# Patient Record
Sex: FEMALE | Race: WHITE | NOT HISPANIC OR LATINO | Employment: OTHER | URBAN - METROPOLITAN AREA
[De-identification: names, ages, dates, MRNs, and addresses within clinical notes are randomized per-mention and may not be internally consistent; named-entity substitution may affect disease eponyms.]

---

## 2017-01-05 ENCOUNTER — HISTORICAL (OUTPATIENT)
Dept: ADMINISTRATIVE | Facility: HOSPITAL | Age: 62
End: 2017-01-05

## 2017-01-12 LAB — CRC RECOMMENDATION EXT: NORMAL

## 2017-11-06 ENCOUNTER — HISTORICAL (OUTPATIENT)
Dept: ADMINISTRATIVE | Facility: HOSPITAL | Age: 62
End: 2017-11-06

## 2018-01-11 ENCOUNTER — HISTORICAL (OUTPATIENT)
Dept: RADIOLOGY | Facility: HOSPITAL | Age: 63
End: 2018-01-11

## 2018-01-11 LAB
DEPRECATED CALCIDIOL+CALCIFEROL SERPL-MC: 35.92 NG/ML (ref 30–80)
T3FREE SERPL-MCNC: 2.68 PG/ML (ref 2.18–3.98)
T4 FREE SERPL-MCNC: 1.01 NG/DL (ref 0.76–1.46)
TSH SERPL-ACNC: 1.12 MIU/ML (ref 0.36–3.74)

## 2018-02-19 ENCOUNTER — HISTORICAL (OUTPATIENT)
Dept: ANESTHESIOLOGY | Facility: HOSPITAL | Age: 63
End: 2018-02-19

## 2019-06-12 ENCOUNTER — HISTORICAL (OUTPATIENT)
Dept: ADMINISTRATIVE | Facility: HOSPITAL | Age: 64
End: 2019-06-12

## 2019-06-12 LAB
BUN SERPL-MCNC: 13 MG/DL (ref 7–18)
CALCIUM SERPL-MCNC: 8.8 MG/DL (ref 8.5–10.1)
CHLORIDE SERPL-SCNC: 107 MMOL/L (ref 98–107)
CHOLEST SERPL-MCNC: 192 MG/DL (ref 0–200)
CHOLEST/HDLC SERPL: 3.1 {RATIO} (ref 0–4)
CO2 SERPL-SCNC: 26 MMOL/L (ref 21–32)
CREAT SERPL-MCNC: 0.65 MG/DL (ref 0.55–1.02)
CREAT/UREA NIT SERPL: 20
DEPRECATED CALCIDIOL+CALCIFEROL SERPL-MC: 21.56 NG/ML (ref 30–80)
GLUCOSE SERPL-MCNC: 99 MG/DL (ref 74–106)
HDLC SERPL-MCNC: 62 MG/DL (ref 35–60)
LDLC SERPL CALC-MCNC: 100 MG/DL (ref 0–129)
POTASSIUM SERPL-SCNC: 4.6 MMOL/L (ref 3.5–5.1)
SODIUM SERPL-SCNC: 139 MMOL/L (ref 136–145)
TRIGL SERPL-MCNC: 148 MG/DL (ref 30–150)
TSH SERPL-ACNC: 1.73 MIU/L (ref 0.36–3.74)
VLDLC SERPL CALC-MCNC: 30 MG/DL

## 2019-10-18 ENCOUNTER — HISTORICAL (OUTPATIENT)
Dept: ADMINISTRATIVE | Facility: HOSPITAL | Age: 64
End: 2019-10-18

## 2019-10-18 LAB
EST. AVERAGE GLUCOSE BLD GHB EST-MCNC: 105 MG/DL
HBA1C MFR BLD: 5.3 % (ref 4.2–6.3)

## 2020-06-09 ENCOUNTER — HISTORICAL (OUTPATIENT)
Dept: ADMINISTRATIVE | Facility: HOSPITAL | Age: 65
End: 2020-06-09

## 2020-06-09 LAB
ABS NEUT (OLG): 3.06 X10(3)/MCL (ref 2.1–9.2)
ALBUMIN SERPL-MCNC: 3.7 GM/DL (ref 3.4–5)
ALBUMIN/GLOB SERPL: 1.4 RATIO (ref 1.1–2)
ALP SERPL-CCNC: 47 UNIT/L (ref 40–150)
ALT SERPL-CCNC: 15 UNIT/L (ref 0–55)
AST SERPL-CCNC: 13 UNIT/L (ref 5–34)
BASOPHILS # BLD AUTO: 0 X10(3)/MCL (ref 0–0.2)
BASOPHILS NFR BLD AUTO: 0 %
BILIRUB SERPL-MCNC: 0.4 MG/DL
BILIRUBIN DIRECT+TOT PNL SERPL-MCNC: 0.1 MG/DL (ref 0–0.5)
BILIRUBIN DIRECT+TOT PNL SERPL-MCNC: 0.3 MG/DL (ref 0–0.8)
BUN SERPL-MCNC: 16.9 MG/DL (ref 9.8–20.1)
CALCIUM SERPL-MCNC: 8.6 MG/DL (ref 8.4–10.2)
CHLORIDE SERPL-SCNC: 108 MMOL/L (ref 98–107)
CHOLEST SERPL-MCNC: 207 MG/DL
CHOLEST/HDLC SERPL: 4 {RATIO} (ref 0–5)
CO2 SERPL-SCNC: 24 MMOL/L (ref 23–31)
CREAT SERPL-MCNC: 0.66 MG/DL (ref 0.55–1.02)
DEPRECATED CALCIDIOL+CALCIFEROL SERPL-MC: 31.1 NG/ML (ref 6.6–49.9)
EOSINOPHIL # BLD AUTO: 0 X10(3)/MCL (ref 0–0.9)
EOSINOPHIL NFR BLD AUTO: 1 %
ERYTHROCYTE [DISTWIDTH] IN BLOOD BY AUTOMATED COUNT: 12.6 % (ref 11.5–17)
GLOBULIN SER-MCNC: 2.7 GM/DL (ref 2.4–3.5)
GLUCOSE SERPL-MCNC: 91 MG/DL (ref 82–115)
HCT VFR BLD AUTO: 41.8 % (ref 37–47)
HDLC SERPL-MCNC: 49 MG/DL (ref 35–60)
HGB BLD-MCNC: 13 GM/DL (ref 12–16)
LDLC SERPL CALC-MCNC: 114 MG/DL (ref 50–140)
LYMPHOCYTES # BLD AUTO: 1.9 X10(3)/MCL (ref 0.6–4.6)
LYMPHOCYTES NFR BLD AUTO: 35 %
MCH RBC QN AUTO: 30 PG (ref 27–31)
MCHC RBC AUTO-ENTMCNC: 31.1 GM/DL (ref 33–36)
MCV RBC AUTO: 96.3 FL (ref 80–94)
MONOCYTES # BLD AUTO: 0.4 X10(3)/MCL (ref 0.1–1.3)
MONOCYTES NFR BLD AUTO: 7 %
NEUTROPHILS # BLD AUTO: 3.06 X10(3)/MCL (ref 2.1–9.2)
NEUTROPHILS NFR BLD AUTO: 56 %
PLATELET # BLD AUTO: 211 X10(3)/MCL (ref 130–400)
PMV BLD AUTO: 9.9 FL (ref 9.4–12.4)
POTASSIUM SERPL-SCNC: 4.4 MMOL/L (ref 3.5–5.1)
PROT SERPL-MCNC: 6.4 GM/DL (ref 5.8–7.6)
RBC # BLD AUTO: 4.34 X10(6)/MCL (ref 4.2–5.4)
SODIUM SERPL-SCNC: 139 MMOL/L (ref 136–145)
TRIGL SERPL-MCNC: 221 MG/DL (ref 37–140)
TSH SERPL-ACNC: 1.45 UIU/ML (ref 0.35–4.94)
VLDLC SERPL CALC-MCNC: 44 MG/DL
WBC # SPEC AUTO: 5.5 X10(3)/MCL (ref 4.5–11.5)

## 2020-07-14 LAB — BMD RECOMMENDATION EXT: NORMAL

## 2020-12-30 ENCOUNTER — HISTORICAL (OUTPATIENT)
Dept: INFECTIOUS DISEASES | Facility: HOSPITAL | Age: 65
End: 2020-12-30

## 2021-01-22 ENCOUNTER — HISTORICAL (OUTPATIENT)
Dept: ADMINISTRATIVE | Facility: HOSPITAL | Age: 66
End: 2021-01-22

## 2021-06-10 ENCOUNTER — HISTORICAL (OUTPATIENT)
Dept: ADMINISTRATIVE | Facility: HOSPITAL | Age: 66
End: 2021-06-10

## 2021-06-10 LAB
ABS NEUT (OLG): 2.52 X10(3)/MCL (ref 2.1–9.2)
ALBUMIN SERPL-MCNC: 3.6 GM/DL (ref 3.4–4.8)
ALBUMIN/GLOB SERPL: 1.2 RATIO (ref 1.1–2)
ALP SERPL-CCNC: 49 UNIT/L (ref 40–150)
ALT SERPL-CCNC: 13 UNIT/L (ref 0–55)
AST SERPL-CCNC: 15 UNIT/L (ref 5–34)
BASOPHILS # BLD AUTO: 0 X10(3)/MCL (ref 0–0.2)
BASOPHILS NFR BLD AUTO: 1 %
BILIRUB SERPL-MCNC: 0.5 MG/DL
BILIRUBIN DIRECT+TOT PNL SERPL-MCNC: 0.2 MG/DL (ref 0–0.5)
BILIRUBIN DIRECT+TOT PNL SERPL-MCNC: 0.3 MG/DL (ref 0–0.8)
BUN SERPL-MCNC: 12.8 MG/DL (ref 9.8–20.1)
CALCIUM SERPL-MCNC: 9.1 MG/DL (ref 8.4–10.2)
CHLORIDE SERPL-SCNC: 106 MMOL/L (ref 98–107)
CO2 SERPL-SCNC: 23 MMOL/L (ref 23–31)
CREAT SERPL-MCNC: 0.65 MG/DL (ref 0.55–1.02)
DEPRECATED CALCIDIOL+CALCIFEROL SERPL-MC: 58.5 NG/ML (ref 30–80)
EOSINOPHIL # BLD AUTO: 0.1 X10(3)/MCL (ref 0–0.9)
EOSINOPHIL NFR BLD AUTO: 2 %
ERYTHROCYTE [DISTWIDTH] IN BLOOD BY AUTOMATED COUNT: 13.1 % (ref 11.5–17)
GLOBULIN SER-MCNC: 2.9 GM/DL (ref 2.4–3.5)
GLUCOSE SERPL-MCNC: 95 MG/DL (ref 82–115)
HCT VFR BLD AUTO: 38.9 % (ref 37–47)
HGB BLD-MCNC: 12.7 GM/DL (ref 12–16)
LYMPHOCYTES # BLD AUTO: 1.9 X10(3)/MCL (ref 0.6–4.6)
LYMPHOCYTES NFR BLD AUTO: 37 %
MCH RBC QN AUTO: 30 PG (ref 27–31)
MCHC RBC AUTO-ENTMCNC: 32.6 GM/DL (ref 33–36)
MCV RBC AUTO: 91.7 FL (ref 80–94)
MONOCYTES # BLD AUTO: 0.5 X10(3)/MCL (ref 0.1–1.3)
MONOCYTES NFR BLD AUTO: 10 %
NEUTROPHILS # BLD AUTO: 2.52 X10(3)/MCL (ref 2.1–9.2)
NEUTROPHILS NFR BLD AUTO: 50 %
PLATELET # BLD AUTO: 213 X10(3)/MCL (ref 130–400)
PMV BLD AUTO: 10.1 FL (ref 9.4–12.4)
POTASSIUM SERPL-SCNC: 4.7 MMOL/L (ref 3.5–5.1)
PROT SERPL-MCNC: 6.5 GM/DL (ref 5.8–7.6)
RBC # BLD AUTO: 4.24 X10(6)/MCL (ref 4.2–5.4)
SODIUM SERPL-SCNC: 142 MMOL/L (ref 136–145)
WBC # SPEC AUTO: 5 X10(3)/MCL (ref 4.5–11.5)

## 2021-07-15 LAB — BCS RECOMMENDATION EXT: NORMAL

## 2022-04-11 ENCOUNTER — HISTORICAL (OUTPATIENT)
Dept: ADMINISTRATIVE | Facility: HOSPITAL | Age: 67
End: 2022-04-11
Payer: MEDICARE

## 2022-04-28 VITALS
BODY MASS INDEX: 22.28 KG/M2 | SYSTOLIC BLOOD PRESSURE: 126 MMHG | WEIGHT: 147 LBS | DIASTOLIC BLOOD PRESSURE: 62 MMHG | HEIGHT: 68 IN | OXYGEN SATURATION: 99 %

## 2022-06-14 DIAGNOSIS — E55.9 VITAMIN D DEFICIENCY: Primary | ICD-10-CM

## 2022-06-14 DIAGNOSIS — Z13.220 SCREENING FOR HYPERLIPIDEMIA: ICD-10-CM

## 2022-06-14 DIAGNOSIS — E78.1 HYPERTRIGLYCERIDEMIA: ICD-10-CM

## 2022-06-14 DIAGNOSIS — Z72.0 TOBACCO USER: ICD-10-CM

## 2022-06-15 ENCOUNTER — LAB VISIT (OUTPATIENT)
Dept: LAB | Facility: HOSPITAL | Age: 67
End: 2022-06-15
Attending: INTERNAL MEDICINE
Payer: MEDICARE

## 2022-06-15 DIAGNOSIS — E78.1 HYPERTRIGLYCERIDEMIA: ICD-10-CM

## 2022-06-15 DIAGNOSIS — Z13.220 SCREENING FOR HYPERLIPIDEMIA: ICD-10-CM

## 2022-06-15 DIAGNOSIS — E55.9 VITAMIN D DEFICIENCY: ICD-10-CM

## 2022-06-15 DIAGNOSIS — Z72.0 TOBACCO USER: ICD-10-CM

## 2022-06-15 LAB
ALBUMIN SERPL-MCNC: 3.7 GM/DL (ref 3.4–4.8)
ALBUMIN/GLOB SERPL: 1.4 RATIO (ref 1.1–2)
ALP SERPL-CCNC: 60 UNIT/L (ref 40–150)
ALT SERPL-CCNC: 25 UNIT/L (ref 0–55)
AST SERPL-CCNC: 23 UNIT/L (ref 5–34)
BASOPHILS # BLD AUTO: 0.03 X10(3)/MCL (ref 0–0.2)
BASOPHILS NFR BLD AUTO: 0.6 %
BILIRUBIN DIRECT+TOT PNL SERPL-MCNC: 0.6 MG/DL
BUN SERPL-MCNC: 14.3 MG/DL (ref 9.8–20.1)
CALCIUM SERPL-MCNC: 8.8 MG/DL (ref 8.4–10.2)
CHLORIDE SERPL-SCNC: 109 MMOL/L (ref 98–107)
CHOLEST SERPL-MCNC: 197 MG/DL
CHOLEST/HDLC SERPL: 3 {RATIO} (ref 0–5)
CO2 SERPL-SCNC: 23 MMOL/L (ref 23–31)
CREAT SERPL-MCNC: 0.63 MG/DL (ref 0.55–1.02)
DEPRECATED CALCIDIOL+CALCIFEROL SERPL-MC: 57.1 NG/ML (ref 30–80)
EOSINOPHIL # BLD AUTO: 0.08 X10(3)/MCL (ref 0–0.9)
EOSINOPHIL NFR BLD AUTO: 1.6 %
ERYTHROCYTE [DISTWIDTH] IN BLOOD BY AUTOMATED COUNT: 12.8 % (ref 11.5–17)
GLOBULIN SER-MCNC: 2.7 GM/DL (ref 2.4–3.5)
GLUCOSE SERPL-MCNC: 100 MG/DL (ref 82–115)
HCT VFR BLD AUTO: 39.6 % (ref 37–47)
HDLC SERPL-MCNC: 57 MG/DL (ref 35–60)
HGB BLD-MCNC: 12.9 GM/DL (ref 12–16)
IMM GRANULOCYTES # BLD AUTO: 0.02 X10(3)/MCL (ref 0–0.02)
IMM GRANULOCYTES NFR BLD AUTO: 0.4 % (ref 0–0.43)
LDLC SERPL CALC-MCNC: 113 MG/DL (ref 50–140)
LYMPHOCYTES # BLD AUTO: 1.35 X10(3)/MCL (ref 0.6–4.6)
LYMPHOCYTES NFR BLD AUTO: 27.3 %
MCH RBC QN AUTO: 29.9 PG (ref 27–31)
MCHC RBC AUTO-ENTMCNC: 32.6 MG/DL (ref 33–36)
MCV RBC AUTO: 91.9 FL (ref 80–94)
MONOCYTES # BLD AUTO: 0.36 X10(3)/MCL (ref 0.1–1.3)
MONOCYTES NFR BLD AUTO: 7.3 %
NEUTROPHILS # BLD AUTO: 3.1 X10(3)/MCL (ref 2.1–9.2)
NEUTROPHILS NFR BLD AUTO: 62.8 %
NRBC BLD AUTO-RTO: 0 %
PLATELET # BLD AUTO: 220 X10(3)/MCL (ref 130–400)
PMV BLD AUTO: 9.9 FL (ref 9.4–12.4)
POTASSIUM SERPL-SCNC: 4.2 MMOL/L (ref 3.5–5.1)
PROT SERPL-MCNC: 6.4 GM/DL (ref 5.8–7.6)
RBC # BLD AUTO: 4.31 X10(6)/MCL (ref 4.2–5.4)
SODIUM SERPL-SCNC: 139 MMOL/L (ref 136–145)
TRIGL SERPL-MCNC: 133 MG/DL (ref 37–140)
VLDLC SERPL CALC-MCNC: 27 MG/DL
WBC # SPEC AUTO: 4.9 X10(3)/MCL (ref 4.5–11.5)

## 2022-06-15 PROCEDURE — 36415 COLL VENOUS BLD VENIPUNCTURE: CPT

## 2022-06-15 PROCEDURE — 80053 COMPREHEN METABOLIC PANEL: CPT

## 2022-06-15 PROCEDURE — 82306 VITAMIN D 25 HYDROXY: CPT

## 2022-06-15 PROCEDURE — 85025 COMPLETE CBC W/AUTO DIFF WBC: CPT

## 2022-06-15 PROCEDURE — 80061 LIPID PANEL: CPT

## 2022-06-21 ENCOUNTER — OFFICE VISIT (OUTPATIENT)
Dept: INTERNAL MEDICINE | Facility: CLINIC | Age: 67
End: 2022-06-21
Payer: MEDICARE

## 2022-06-21 ENCOUNTER — DOCUMENTATION ONLY (OUTPATIENT)
Dept: ADMINISTRATIVE | Facility: HOSPITAL | Age: 67
End: 2022-06-21
Payer: MEDICARE

## 2022-06-21 VITALS
SYSTOLIC BLOOD PRESSURE: 120 MMHG | OXYGEN SATURATION: 98 % | HEART RATE: 75 BPM | WEIGHT: 142 LBS | BODY MASS INDEX: 21.03 KG/M2 | HEIGHT: 69 IN | DIASTOLIC BLOOD PRESSURE: 72 MMHG | TEMPERATURE: 98 F

## 2022-06-21 DIAGNOSIS — E55.9 VITAMIN D DEFICIENCY: Primary | ICD-10-CM

## 2022-06-21 DIAGNOSIS — Z13.6 SCREENING FOR ISCHEMIC HEART DISEASE: ICD-10-CM

## 2022-06-21 DIAGNOSIS — Z82.49 FAMILY HISTORY OF EARLY CAD: ICD-10-CM

## 2022-06-21 DIAGNOSIS — Z00.00 ENCOUNTER FOR MEDICARE ANNUAL WELLNESS EXAM: ICD-10-CM

## 2022-06-21 PROCEDURE — 99387 PR PREVENTIVE VISIT,NEW,65 & OVER: ICD-10-PCS | Mod: GZ,,, | Performed by: INTERNAL MEDICINE

## 2022-06-21 PROCEDURE — 99387 INIT PM E/M NEW PAT 65+ YRS: CPT | Mod: GZ,,, | Performed by: INTERNAL MEDICINE

## 2022-06-21 RX ORDER — DIAZEPAM 5 MG/1
5 TABLET ORAL DAILY PRN
COMMUNITY
Start: 2021-06-17 | End: 2023-06-22 | Stop reason: SDUPTHER

## 2022-06-21 RX ORDER — DIPHENHYDRAMINE HCL 25 MG
25 CAPSULE ORAL NIGHTLY
COMMUNITY
End: 2022-11-08

## 2022-06-21 RX ORDER — ACETAMINOPHEN 500 MG
5000 TABLET ORAL DAILY
COMMUNITY

## 2022-06-21 NOTE — PROGRESS NOTES
Subjective:      Chief Complaint: Medicare AWV (Surgery clearance)      HPI: She is here for her medicare wellness.  No complaints.    Problem Noted   Encounter for Medicare Annual Wellness Exam 6/21/2022   Vitamin D Deficiency 6/21/2022   Family History of Early Cad 6/21/2022        The patient's Health Maintenance was reviewed and the following appears to be due:   Health Maintenance Due   Topic Date Due    Hepatitis C Screening  Never done    Colorectal Cancer Screening  Never done    COVID-19 Vaccine (3 - Booster for Moderna series) 10/03/2021    Mammogram  07/15/2022       Past Medical History:  History reviewed. No pertinent past medical history.  Past Surgical History:   Procedure Laterality Date    HYSTERECTOMY      LUMBAR DISC SURGERY      TONSILLECTOMY       Review of patient's allergies indicates:  No Known Allergies  Current Outpatient Medications on File Prior to Visit   Medication Sig Dispense Refill    cholecalciferol, vitamin D3, 125 mcg (5,000 unit) Tab Take 5,000 Units by mouth once daily.      COMPOUND HORMONE REPLACEMENT Take 1 capsule by mouth once daily. Biest/prog      diazePAM (VALIUM) 5 MG tablet Take 5 mg by mouth daily as needed for Pain.      diphenhydrAMINE (BENADRYL) 25 mg capsule Take 25 mg by mouth every evening.       No current facility-administered medications on file prior to visit.     Social History     Socioeconomic History    Marital status:    Tobacco Use    Smoking status: Light Tobacco Smoker     Types: Cigarettes    Smokeless tobacco: Never Used   Substance and Sexual Activity    Alcohol use: Yes     Comment: 4-5x/wk    Drug use: Never     Family History   Problem Relation Age of Onset    Coronary artery disease Mother     Coronary artery disease Father     Stroke Father     Coronary artery disease Sister 60    Coronary artery disease Brother 63    Breast cancer Maternal Grandmother        Review of Systems    Objective:   /72 (BP  "Location: Right arm, Patient Position: Sitting)   Pulse 75   Temp 98.4 °F (36.9 °C)   Ht 5' 9" (1.753 m)   Wt 64.4 kg (142 lb)   SpO2 98%   BMI 20.97 kg/m²     Physical Exam  Constitutional:       General: She is not in acute distress.     Appearance: Normal appearance.   HENT:      Head: Normocephalic and atraumatic.   Eyes:      General: No scleral icterus.     Conjunctiva/sclera: Conjunctivae normal.   Neck:      Vascular: No carotid bruit.   Cardiovascular:      Rate and Rhythm: Normal rate and regular rhythm.      Pulses: Normal pulses.      Heart sounds: Normal heart sounds. No murmur heard.    No friction rub. No gallop.   Pulmonary:      Effort: Pulmonary effort is normal.      Breath sounds: Normal breath sounds.   Abdominal:      General: Bowel sounds are normal.      Palpations: Abdomen is soft. There is no mass.      Tenderness: There is no abdominal tenderness. There is no guarding or rebound.   Musculoskeletal:         General: No deformity.      Cervical back: No rigidity or tenderness.      Right lower leg: No edema.      Left lower leg: No edema.   Lymphadenopathy:      Cervical: No cervical adenopathy.   Skin:     Coloration: Skin is not jaundiced or pale.      Findings: No erythema.   Neurological:      General: No focal deficit present.      Mental Status: She is alert and oriented to person, place, and time.      Gait: Gait normal.   Psychiatric:         Mood and Affect: Mood normal.         Behavior: Behavior normal.         Thought Content: Thought content normal.         Judgment: Judgment normal.       Assessment and Plan:     Encounter for Medicare annual wellness exam  Health Maintenance Due   Topic Date Due    Hepatitis C Screening  Never done    Colorectal Cancer Screening  Never done    COVID-19 Vaccine (3 - Booster for Moderna series) 10/03/2021    Mammogram  07/15/2022   She sees Dr. Snell who gives her the orders for her mmg.  A covid booster was recommended.  C-scope is " "up to date.    She already has an advanced directive.      Family history of early CAD  Will get EKG.  Also rec CT calcium score.    I have reviewed the nurse notes and flow sheets to reviewe the HRA, AD>, TUG and Whister test, Depression and fall screening and minim cog.    A personalized 5-10 yr written screening schedule and personal prevention plan has been developed using the uspstf age appropriate recommendations and this was provided to the patient at the end of today's visit.      Follow up in about 1 year (around 6/21/2023).       [unfilled]  Orders Placed This Encounter   Procedures    Lipid Panel     Standing Status:   Future     Standing Expiration Date:   8/20/2023    Comprehensive Metabolic Panel     Standing Status:   Future     Standing Expiration Date:   8/20/2023    CBC Auto Differential     Standing Status:   Future     Standing Expiration Date:   8/20/2023    Vitamin D     Standing Status:   Future     Standing Expiration Date:   8/20/2023    Hepatitis C Antibody     Standing Status:   Future     Standing Expiration Date:   8/20/2023     Order Specific Question:   Release to patient     Answer:   Immediate       TIME UP & GO (TUG)  Test begins with patient sitting back in standard arm chair.   When "Go" is said, the patient stands up and walks 10 feet at a normal pace before turning, walking back and sitting down.    Observe the patients postural stability, gait, stride length, and sway.  Check all that apply:  ? [] Slow tentative pace  ? [] Loss of balance  ? [] Short strides  ? [] Little or no arm swing  ? [] Steadying self on walls  ? [] Shuffling  ? [] En bloc turning  ? [] Not using assistive device properly    Time in seconds:  7 Seconds  (Older adults who takes = or > 12 seconds to complete TUG is at risk for falling.      WHISPER TEST  Test begins with patient standing arms length away (2 feet), facing away from examiner.  Patient covers the ear that is NOT being tested with one " finger over the tragus.  Whisper a number-letter-number combination.  If a patient gets 3 total letters and/or numbers correct after a second attempt, it is considered a pass.    Right Ear: passed    [] 8-M-3   [] K-5-R   [] 2-K-7   [] S-4-G  Left Ear: passed       [] 8-M-3   [] K-5-R   [] 2-K-7   [] S-4-G      VISION SCREENING  unable to measure      MINI-COGNITIVE  Three Word Registration   []Version 1 []Version 2 []Version 3 []Version 4 []Version 5 []Version 6   Banana Leader [facility] Captain Daughter   [employer] Kitchen [facility] [employer]   Chair Table Baby Finger Picture [place]ain     Word Recall 3 points  Clock Drawing 2      HOME SAFETY QUESTIONNAIRE  Are emergency numbers kept by the phone and regularly updated? Yes  Are all household members aware of the dangers of smoking, especially in bed? Yes  Are working smoke alarm(s) and fire extinguisher(s) available for use? Yes  Do all household members know how to use them? Yes  Are firearms stored unloaded and securely locked? Yes  Have throw rugs been removed or fastened down? Yes  Are non-slip mats in all bathtubs and showers?  Yes  Do all stairways have a railing or banister?  Yes  Are sidewalks and all outdoor steps clear of tools, toys and other articles?  Yes  Are doorways, halls, and stairs free of clutter?  Yes  Are all electrical cords in working order, easily seen, and not run under rug/carpets or wrapped around nails? Yes

## 2022-06-21 NOTE — ASSESSMENT & PLAN NOTE
Health Maintenance Due   Topic Date Due    Hepatitis C Screening  Never done    Colorectal Cancer Screening  Never done    COVID-19 Vaccine (3 - Booster for Moderna series) 10/03/2021    Mammogram  07/15/2022   She sees Dr. Snell who gives her the orders for her mmg.  A covid booster was recommended.  C-scope is up to date.

## 2022-06-21 NOTE — PATIENT INSTRUCTIONS
Jorge Siu,     If you are due for any health screening(s) below please notify me so we can arrange them to be ordered and scheduled to maintain your health. Most healthy patients complete it. Don't lose out on improving your health.     Health Maintenance   Topic Date Due    Hepatitis C Screening  Never done    Mammogram  07/15/2022    DEXA Scan  07/14/2023    Lipid Panel  06/15/2027    TETANUS VACCINE  06/07/2028            Although you are still overdue for this important screening, due to the COVID-19 pandemic, we recommend scheduling it in the near future.

## 2022-06-27 ENCOUNTER — DOCUMENTATION ONLY (OUTPATIENT)
Dept: INTERNAL MEDICINE | Facility: CLINIC | Age: 67
End: 2022-06-27
Payer: MEDICARE

## 2022-09-26 PROBLEM — Z00.00 ENCOUNTER FOR MEDICARE ANNUAL WELLNESS EXAM: Status: RESOLVED | Noted: 2022-06-21 | Resolved: 2022-09-26

## 2022-10-17 ENCOUNTER — DOCUMENTATION ONLY (OUTPATIENT)
Dept: ADMINISTRATIVE | Facility: HOSPITAL | Age: 67
End: 2022-10-17
Payer: MEDICARE

## 2022-11-08 ENCOUNTER — OFFICE VISIT (OUTPATIENT)
Dept: INTERNAL MEDICINE | Facility: CLINIC | Age: 67
End: 2022-11-08
Payer: MEDICARE

## 2022-11-08 VITALS
DIASTOLIC BLOOD PRESSURE: 80 MMHG | WEIGHT: 151.81 LBS | SYSTOLIC BLOOD PRESSURE: 128 MMHG | RESPIRATION RATE: 16 BRPM | HEIGHT: 69 IN | BODY MASS INDEX: 22.48 KG/M2 | HEART RATE: 79 BPM | OXYGEN SATURATION: 98 %

## 2022-11-08 DIAGNOSIS — Z23 NEED FOR INFLUENZA VACCINATION: Primary | ICD-10-CM

## 2022-11-08 DIAGNOSIS — R05.9 COUGH, UNSPECIFIED TYPE: ICD-10-CM

## 2022-11-08 DIAGNOSIS — G47.00 INSOMNIA, UNSPECIFIED TYPE: ICD-10-CM

## 2022-11-08 PROCEDURE — 96372 THER/PROPH/DIAG INJ SC/IM: CPT | Mod: ,,, | Performed by: NURSE PRACTITIONER

## 2022-11-08 PROCEDURE — 96372 PR INJECTION,THERAP/PROPH/DIAG2ST, IM OR SUBCUT: ICD-10-PCS | Mod: ,,, | Performed by: NURSE PRACTITIONER

## 2022-11-08 PROCEDURE — 99214 OFFICE O/P EST MOD 30 MIN: CPT | Mod: 25,,, | Performed by: NURSE PRACTITIONER

## 2022-11-08 PROCEDURE — G0008 ADMIN INFLUENZA VIRUS VAC: HCPCS | Mod: 59,,, | Performed by: NURSE PRACTITIONER

## 2022-11-08 PROCEDURE — 90694 FLU VACCINE - QUADRIVALENT - ADJUVANTED: ICD-10-PCS | Mod: ,,, | Performed by: NURSE PRACTITIONER

## 2022-11-08 PROCEDURE — 99214 PR OFFICE/OUTPT VISIT, EST, LEVL IV, 30-39 MIN: ICD-10-PCS | Mod: 25,,, | Performed by: NURSE PRACTITIONER

## 2022-11-08 PROCEDURE — G0008 FLU VACCINE - QUADRIVALENT - ADJUVANTED: ICD-10-PCS | Mod: 59,,, | Performed by: NURSE PRACTITIONER

## 2022-11-08 PROCEDURE — 90694 VACC AIIV4 NO PRSRV 0.5ML IM: CPT | Mod: ,,, | Performed by: NURSE PRACTITIONER

## 2022-11-08 RX ORDER — DEXAMETHASONE SODIUM PHOSPHATE 4 MG/ML
8 INJECTION, SOLUTION INTRA-ARTICULAR; INTRALESIONAL; INTRAMUSCULAR; INTRAVENOUS; SOFT TISSUE ONCE
Status: COMPLETED | OUTPATIENT
Start: 2022-11-08 | End: 2022-11-08

## 2022-11-08 RX ORDER — AZITHROMYCIN 250 MG/1
TABLET, FILM COATED ORAL
Qty: 6 TABLET | Refills: 0 | Status: SHIPPED | OUTPATIENT
Start: 2022-11-08 | End: 2022-11-13

## 2022-11-08 RX ORDER — TRAZODONE HYDROCHLORIDE 50 MG/1
25 TABLET ORAL NIGHTLY
Qty: 15 TABLET | Refills: 11 | Status: SHIPPED | OUTPATIENT
Start: 2022-11-08 | End: 2023-01-30

## 2022-11-08 RX ADMIN — DEXAMETHASONE SODIUM PHOSPHATE 8 MG: 4 INJECTION, SOLUTION INTRA-ARTICULAR; INTRALESIONAL; INTRAMUSCULAR; INTRAVENOUS; SOFT TISSUE at 11:11

## 2022-11-08 NOTE — PROGRESS NOTES
"Subjective:      Patient ID: Sherron Santos is a 67 y.o. female.    Chief Complaint: URI (Pt is here because she has had cold symptoms for 2 weeks, pt has post nasal drip, headache, off balance, dizzy, chest congestion, dry cough, no fever. Pt took covid test 2 fridays ago at home and it was pos and went to  and it was negatvie went end of october)      HPI: Patient here today for c/o "lingering cough" x 18 days. She reports + home COVID approx 2 weeks ago. C neg.  Denies fever, chills, or sweats. Cough is occ productive.  Slight PND.  No wheezing or SOB. No known sick exposure.    Vaccines: Flu    Review of patient's allergies indicates:  No Known Allergies    Review of Systems  Constitutional: No fever, No chills, No sweats, No fatigue  Ear/Nose/Mouth/Throat: nasal congestion, No vertigo. No sore throat. PND  Respiratory: No shortness of breath, cough with sputum production, No wheezing, No exertional dyspnea.   Cardiovascular: No chest pain, No palpitations  Neurologic: No altered mental status, No headaches.  Psychiatrics: No anxiety,No depression, No SI/HI. Insomnia    Objective:   /80 (BP Location: Left arm, Patient Position: Sitting, BP Method: Medium (Manual))   Pulse 79   Resp 16   Ht 5' 9" (1.753 m)   Wt 68.9 kg (151 lb 12.8 oz)   SpO2 98%   BMI 22.42 kg/m²     Physical Exam  Vitals and nursing note reviewed.   Constitutional:       Appearance: Normal appearance. She is normal weight.   HENT:      Head: Normocephalic and atraumatic.      Right Ear: Tympanic membrane, ear canal and external ear normal.      Left Ear: Tympanic membrane, ear canal and external ear normal.      Nose: Nose normal.      Mouth/Throat:      Mouth: Mucous membranes are moist.      Pharynx: Oropharynx is clear.   Cardiovascular:      Rate and Rhythm: Normal rate and regular rhythm.      Pulses: Normal pulses.      Heart sounds: Normal heart sounds.   Pulmonary:      Effort: Pulmonary effort is normal.      Breath " sounds: Normal breath sounds.   Musculoskeletal:         General: Normal range of motion.      Cervical back: Normal range of motion and neck supple.   Skin:     General: Skin is warm and dry.   Neurological:      General: No focal deficit present.      Mental Status: She is alert and oriented to person, place, and time. Mental status is at baseline.   Psychiatric:         Mood and Affect: Mood normal.         Thought Content: Thought content normal.         Judgment: Judgment normal.       Assessment/Plan:   1. Cough, unspecified type  Decadron IM today.  OTCs as rec.  For worsening/persistent s/s, proceed with [employer] [place] fluids.    - dexAMETHasone injection 8 mg  - azithromycin (Z-ISAC) 250 MG tablet; Take 2 tablets by mouth on day 1; Take 1 tablet by mouth on days 2-5  Dispense: 6 tablet; Refill: 0    2. Insomnia, unspecified type  Trial of trazodone 25-50mg at bed time.  Rec good sleep hygiene habits.    - traZODone (DESYREL) 50 MG tablet; Take 0.5 tablets (25 mg total) by mouth every evening.  Dispense: 15 tablet; Refill: 11    3. Need for influenza vaccination    - Influenza (FLUAD) - Quadrivalent (Adjuvanted) *Preferred* (65+) (PF)    Medication List with Changes/Refills   New Medications    AZITHROMYCIN (Z-ISAC) 250 MG TABLET    Take 2 tablets by mouth on day 1; Take 1 tablet by mouth on days 2-5    TRAZODONE (DESYREL) 50 MG TABLET    Take 0.5 tablets (25 mg total) by mouth every evening.   Current Medications    CHOLECALCIFEROL, VITAMIN D3, 125 MCG (5,000 UNIT) TAB    Take 5,000 Units by mouth once daily.    DIAZEPAM (VALIUM) 5 MG TABLET    Take 5 mg by mouth daily as needed for Pain.    UNABLE TO FIND    TAKE ONE CAPSULE BY MOUTH ONCE DAILY   Discontinued Medications    COMPOUND HORMONE REPLACEMENT    Take 1 capsule by mouth once daily. Biest/prog    DIPHENHYDRAMINE (BENADRYL) 25 MG CAPSULE    Take 25 mg by mouth every evening.        No follow-ups on file.

## 2023-01-30 ENCOUNTER — OFFICE VISIT (OUTPATIENT)
Dept: INTERNAL MEDICINE | Facility: CLINIC | Age: 68
End: 2023-01-30
Payer: MEDICARE

## 2023-01-30 VITALS
RESPIRATION RATE: 14 BRPM | OXYGEN SATURATION: 99 % | HEART RATE: 85 BPM | WEIGHT: 149 LBS | HEIGHT: 69 IN | SYSTOLIC BLOOD PRESSURE: 138 MMHG | BODY MASS INDEX: 22.07 KG/M2 | DIASTOLIC BLOOD PRESSURE: 80 MMHG

## 2023-01-30 DIAGNOSIS — G47.09 OTHER INSOMNIA: ICD-10-CM

## 2023-01-30 DIAGNOSIS — J01.90 ACUTE SINUSITIS, RECURRENCE NOT SPECIFIED, UNSPECIFIED LOCATION: Primary | ICD-10-CM

## 2023-01-30 DIAGNOSIS — G47.00 INSOMNIA, UNSPECIFIED TYPE: ICD-10-CM

## 2023-01-30 DIAGNOSIS — R05.1 ACUTE COUGH: ICD-10-CM

## 2023-01-30 PROCEDURE — 99214 OFFICE O/P EST MOD 30 MIN: CPT | Mod: ,,, | Performed by: NURSE PRACTITIONER

## 2023-01-30 PROCEDURE — 99214 PR OFFICE/OUTPT VISIT, EST, LEVL IV, 30-39 MIN: ICD-10-PCS | Mod: ,,, | Performed by: NURSE PRACTITIONER

## 2023-01-30 RX ORDER — PROMETHAZINE HYDROCHLORIDE AND DEXTROMETHORPHAN HYDROBROMIDE 6.25; 15 MG/5ML; MG/5ML
5 SYRUP ORAL EVERY 6 HOURS PRN
Qty: 120 ML | Refills: 0 | Status: SHIPPED | OUTPATIENT
Start: 2023-01-30 | End: 2023-02-09

## 2023-01-30 RX ORDER — TRAZODONE HYDROCHLORIDE 50 MG/1
50 TABLET ORAL NIGHTLY
Qty: 90 TABLET | Refills: 4 | Status: SHIPPED | OUTPATIENT
Start: 2023-01-30 | End: 2024-01-30

## 2023-01-30 RX ORDER — CEFDINIR 300 MG/1
300 CAPSULE ORAL 2 TIMES DAILY
Qty: 20 CAPSULE | Refills: 0 | Status: SHIPPED | OUTPATIENT
Start: 2023-01-30 | End: 2023-02-09

## 2023-01-30 NOTE — PROGRESS NOTES
"Subjective:      Patient ID: Sherron Santos is a 67 y.o. female.    Chief Complaint: Cough and Nasal Congestion (Pt has cough and nasal congestion and it is not color now but when it started several weeks ago it was green and it is lingering. [place] toler[place] but her  lit the fireplace and her nose started getting stuffy and a lot of post nasal drip, and runny nose)      HPI: Patient here today for c/o sinus issues x several weeks. Reports that s/s were stable; however, recurred over the weekend when  lit fireplace. States s/s PND and runny nose. She does utilize Flonase and Sudafed. Denies fever, chills, or sweats. Nasal d/c green/yellow presently. She admits to being in a crowd over the week, but had them prior to event. I did see me in Nov with RX of Z-pack and steroid IM with resolve of s/s at that time.    Review of patient's allergies indicates:  No Known Allergies    Review of Systems  Constitutional: No fever, No chills, No sweats, No fatigue  Ear/Nose/Mouth/Throat: nasal congestion, No vertigo.  Respiratory: No shortness of breath, No cough, No sputum production, No wheezing, No exertional dyspnea.   Cardiovascular: No chest pain, No palpitations  Gastrointestinal: No nausea, No vomiting    Objective:   /80 (BP Location: Left arm, Patient Position: Sitting, BP Method: Medium (Manual))   Pulse 85   Resp 14   Ht 5' 9" (1.753 m)   Wt 67.6 kg (149 lb)   SpO2 99%   BMI 22.00 kg/m²     Physical Exam  Vitals and nursing note reviewed.   Constitutional:       General: She is not in acute distress.     Appearance: Normal appearance. She is normal weight. She is not ill-appearing, toxic-appearing or diaphoretic.   HENT:      Head: Normocephalic and atraumatic.      Right Ear: Tympanic membrane, ear canal and external ear normal.      Left Ear: Tympanic membrane, ear canal and external ear normal.      Nose: Congestion and rhinorrhea present.    "   Mouth/Throat:      Mouth: Mucous membranes are moist.      Pharynx: Oropharynx is clear. No oropharyngeal exudate or posterior oropharyngeal erythema.   Cardiovascular:      Rate and Rhythm: Normal rate and regular rhythm.      Pulses: Normal pulses.      Heart sounds: Normal heart sounds. No murmur heard.  Pulmonary:      Effort: Pulmonary effort is normal. No respiratory distress.      Breath sounds: Normal breath sounds. No wheezing or rhonchi.   Musculoskeletal:         General: Normal range of motion.      Cervical back: Normal range of motion and neck supple. No rigidity or tenderness.   Lymphadenopathy:      Cervical: No cervical adenopathy.   Skin:     General: Skin is warm and dry.   Neurological:      General: No focal deficit present.      Mental Status: She is alert and oriented to person, place, and time. Mental status is at baseline.      Motor: No weakness.   Psychiatric:         Mood and Affect: Mood normal.         Thought Content: Thought content normal.         Judgment: Judgment normal.       Assessment/Plan:   1. Acute sinusitis, recurrence not specified, unspecified location  Tx with cefdinir 1 tab twice daily x 10 days  OTCs as needed/directed-written guidance provided    - cefdinir (OMNICEF) 300 MG capsule; Take 1 capsule (300 mg total) by mouth 2 (two) times daily. for 10 days  Dispense: 20 capsule; Refill: 0    2. Other insomnia  Doing well on current Rx'd dose of trazodone at 50mg daily  Med refilled    3. Insomnia, unspecified type  See above.  - traZODone (DESYREL) 50 MG tablet; Take 1 tablet (50 mg total) by mouth every evening.  Dispense: 90 tablet; Refill: 4    4. Acute cough  OTCs as needed/directed-written guidance provided  Promethazine DM to be taken at bedtime as needed for cough.    - promethazine-dextromethorphan (PROMETHAZINE-DM) 6.25-15 mg/5 mL Syrp; Take 5 mLs by mouth every 6 (six) hours as needed (cough).  Dispense: 120 mL; Refill: 0      Medication List with  Changes/Refills   New Medications    CEFDINIR (OMNICEF) 300 MG CAPSULE    Take 1 capsule (300 mg total) by mouth 2 (two) times daily. for 10 days    PROMETHAZINE-DEXTROMETHORPHAN (PROMETHAZINE-DM) 6.25-15 MG/5 ML SYRP    Take 5 mLs by mouth every 6 (six) hours as needed (cough).   Current Medications    CHOLECALCIFEROL, VITAMIN D3, 125 MCG (5,000 UNIT) TAB    Take 5,000 Units by mouth once daily.    COMPOUND HORMONE REPLACEMENT    TAKE ONE CAPSULE BY MOUTH ONCE DAILY    DIAZEPAM (VALIUM) 5 MG TABLET    Take 5 mg by mouth daily as needed for Pain.    UNABLE TO FIND    TAKE ONE CAPSULE BY MOUTH ONCE DAILY   Changed and/or Refilled Medications    Modified Medication Previous Medication    TRAZODONE (DESYREL) 50 MG TABLET traZODone (DESYREL) 50 MG tablet       Take 1 tablet (50 mg total) by mouth every evening.    Take 0.5 tablets (25 mg total) by mouth every evening.        No follow-ups on file.

## 2023-06-15 ENCOUNTER — TELEPHONE (OUTPATIENT)
Dept: INTERNAL MEDICINE | Facility: CLINIC | Age: 68
End: 2023-06-15
Payer: MEDICARE

## 2023-06-15 ENCOUNTER — LAB VISIT (OUTPATIENT)
Dept: LAB | Facility: HOSPITAL | Age: 68
End: 2023-06-15
Attending: INTERNAL MEDICINE
Payer: MEDICARE

## 2023-06-15 DIAGNOSIS — Z00.00 ENCOUNTER FOR MEDICARE ANNUAL WELLNESS EXAM: ICD-10-CM

## 2023-06-15 DIAGNOSIS — E55.9 VITAMIN D DEFICIENCY: ICD-10-CM

## 2023-06-15 DIAGNOSIS — Z82.49 FAMILY HISTORY OF EARLY CAD: ICD-10-CM

## 2023-06-15 DIAGNOSIS — Z13.6 SCREENING FOR ISCHEMIC HEART DISEASE: ICD-10-CM

## 2023-06-15 LAB
ALBUMIN SERPL-MCNC: 3.7 G/DL (ref 3.4–4.8)
ALBUMIN/GLOB SERPL: 1.5 RATIO (ref 1.1–2)
ALP SERPL-CCNC: 50 UNIT/L (ref 40–150)
ALT SERPL-CCNC: 18 UNIT/L (ref 0–55)
AST SERPL-CCNC: 16 UNIT/L (ref 5–34)
BASOPHILS # BLD AUTO: 0.04 X10(3)/MCL
BASOPHILS NFR BLD AUTO: 0.7 %
BILIRUBIN DIRECT+TOT PNL SERPL-MCNC: 0.4 MG/DL
BUN SERPL-MCNC: 13.7 MG/DL (ref 9.8–20.1)
CALCIUM SERPL-MCNC: 8.6 MG/DL (ref 8.4–10.2)
CHLORIDE SERPL-SCNC: 109 MMOL/L (ref 98–107)
CHOLEST SERPL-MCNC: 212 MG/DL
CHOLEST/HDLC SERPL: 4 {RATIO} (ref 0–5)
CO2 SERPL-SCNC: 26 MMOL/L (ref 23–31)
CREAT SERPL-MCNC: 0.74 MG/DL (ref 0.55–1.02)
DEPRECATED CALCIDIOL+CALCIFEROL SERPL-MC: 64.5 NG/ML (ref 30–80)
EOSINOPHIL # BLD AUTO: 0.13 X10(3)/MCL (ref 0–0.9)
EOSINOPHIL NFR BLD AUTO: 2.3 %
ERYTHROCYTE [DISTWIDTH] IN BLOOD BY AUTOMATED COUNT: 12.6 % (ref 11.5–17)
GFR SERPLBLD CREATININE-BSD FMLA CKD-EPI: >60 MLS/MIN/1.73/M2
GLOBULIN SER-MCNC: 2.5 GM/DL (ref 2.4–3.5)
GLUCOSE SERPL-MCNC: 91 MG/DL (ref 82–115)
HCT VFR BLD AUTO: 41.4 % (ref 37–47)
HCV AB SERPL QL IA: NONREACTIVE
HDLC SERPL-MCNC: 53 MG/DL (ref 35–60)
HGB BLD-MCNC: 13.2 G/DL (ref 12–16)
IMM GRANULOCYTES # BLD AUTO: 0.01 X10(3)/MCL (ref 0–0.04)
IMM GRANULOCYTES NFR BLD AUTO: 0.2 %
LDLC SERPL CALC-MCNC: 122 MG/DL (ref 50–140)
LYMPHOCYTES # BLD AUTO: 1.98 X10(3)/MCL (ref 0.6–4.6)
LYMPHOCYTES NFR BLD AUTO: 34.7 %
MCH RBC QN AUTO: 30 PG (ref 27–31)
MCHC RBC AUTO-ENTMCNC: 31.9 G/DL (ref 33–36)
MCV RBC AUTO: 94.1 FL (ref 80–94)
MONOCYTES # BLD AUTO: 0.48 X10(3)/MCL (ref 0.1–1.3)
MONOCYTES NFR BLD AUTO: 8.4 %
NEUTROPHILS # BLD AUTO: 3.07 X10(3)/MCL (ref 2.1–9.2)
NEUTROPHILS NFR BLD AUTO: 53.7 %
NRBC BLD AUTO-RTO: 0 %
PLATELET # BLD AUTO: 220 X10(3)/MCL (ref 130–400)
PMV BLD AUTO: 9.8 FL (ref 7.4–10.4)
POTASSIUM SERPL-SCNC: 4.8 MMOL/L (ref 3.5–5.1)
PROT SERPL-MCNC: 6.2 GM/DL (ref 5.8–7.6)
RBC # BLD AUTO: 4.4 X10(6)/MCL (ref 4.2–5.4)
SODIUM SERPL-SCNC: 141 MMOL/L (ref 136–145)
TRIGL SERPL-MCNC: 187 MG/DL (ref 37–140)
VLDLC SERPL CALC-MCNC: 37 MG/DL
WBC # SPEC AUTO: 5.71 X10(3)/MCL (ref 4.5–11.5)

## 2023-06-15 PROCEDURE — 36415 COLL VENOUS BLD VENIPUNCTURE: CPT

## 2023-06-15 PROCEDURE — 80061 LIPID PANEL: CPT

## 2023-06-15 PROCEDURE — 80053 COMPREHEN METABOLIC PANEL: CPT

## 2023-06-15 PROCEDURE — 86803 HEPATITIS C AB TEST: CPT

## 2023-06-15 PROCEDURE — 85025 COMPLETE CBC W/AUTO DIFF WBC: CPT

## 2023-06-15 PROCEDURE — 82306 VITAMIN D 25 HYDROXY: CPT

## 2023-06-15 NOTE — TELEPHONE ENCOUNTER
----- Message from Yareli Nelson LPN sent at 6/15/2023  7:48 AM CDT -----  Regarding: ANDREIA Montanez 6/22/23 @0929  Are there any outstanding tasks in the chart? Pt will need fasting labs    Is there any documentation of tasks? no    Has patient seen another physician, been to the ER, C, or admitted to hospital since last visit?    Has the patient done blood work or imaging since last visit?

## 2023-06-22 ENCOUNTER — OFFICE VISIT (OUTPATIENT)
Dept: INTERNAL MEDICINE | Facility: CLINIC | Age: 68
End: 2023-06-22
Payer: MEDICARE

## 2023-06-22 VITALS
OXYGEN SATURATION: 99 % | RESPIRATION RATE: 18 BRPM | HEIGHT: 69 IN | SYSTOLIC BLOOD PRESSURE: 114 MMHG | WEIGHT: 151 LBS | BODY MASS INDEX: 22.36 KG/M2 | HEART RATE: 71 BPM | DIASTOLIC BLOOD PRESSURE: 68 MMHG

## 2023-06-22 DIAGNOSIS — Z00.00 ENCOUNTER FOR MEDICARE ANNUAL WELLNESS EXAM: ICD-10-CM

## 2023-06-22 DIAGNOSIS — E55.9 VITAMIN D DEFICIENCY: Primary | ICD-10-CM

## 2023-06-22 DIAGNOSIS — N95.1 INSOMNIA ASSOCIATED WITH MENOPAUSE: ICD-10-CM

## 2023-06-22 DIAGNOSIS — E78.5 HYPERLIPIDEMIA, UNSPECIFIED HYPERLIPIDEMIA TYPE: ICD-10-CM

## 2023-06-22 PROCEDURE — G0439 PR MEDICARE ANNUAL WELLNESS SUBSEQUENT VISIT: ICD-10-PCS | Mod: ,,, | Performed by: INTERNAL MEDICINE

## 2023-06-22 PROCEDURE — G0439 PPPS, SUBSEQ VISIT: HCPCS | Mod: ,,, | Performed by: INTERNAL MEDICINE

## 2023-06-22 RX ORDER — DIAZEPAM 5 MG/1
5 TABLET ORAL DAILY PRN
Qty: 30 TABLET | Refills: 0 | Status: SHIPPED | OUTPATIENT
Start: 2023-06-22

## 2023-06-22 RX ORDER — LORATADINE 10 MG/1
10 TABLET ORAL DAILY
COMMUNITY

## 2023-06-22 NOTE — PROGRESS NOTES
Subjective:        Patient Care Team:  Angelica Montanez MD as PCP - General (Internal Medicine)     Chief Complaint: Medicare AWV (Discuss labs/)      HPI:  She is here for her medicare wellness.  She hasn't been sleeping well.  She doesn't nap during the day.  She isn't exercising enough.    Problem Noted   Encounter for Medicare Annual Wellness Exam 6/21/2022   Insomnia Associated With Menopause 6/22/2023   Vitamin D Deficiency 6/21/2022   Family History of Early Cad 6/21/2022        The patient's Health Maintenance was reviewed and the following appears to be due:   Health Maintenance Due   Topic Date Due    COVID-19 Vaccine (3 - Moderna series) 06/28/2021    DEXA Scan  07/14/2022    Mammogram  08/02/2023       Past Medical History:  Past Medical History:   Diagnosis Date    Personal history of colonic polyps      Past Surgical History:   Procedure Laterality Date    CATARACT EXTRACTION Bilateral 07/2022    COLONOSCOPY W/ POLYPECTOMY  01/12/2017    HYSTERECTOMY      LUMBAR DISC SURGERY      TONSILLECTOMY       Review of patient's allergies indicates:  No Known Allergies  Current Outpatient Medications on File Prior to Visit   Medication Sig Dispense Refill    cholecalciferol, vitamin D3, 125 mcg (5,000 unit) Tab Take 5,000 Units by mouth once daily.      COMPOUND HORMONE REPLACEMENT TAKE ONE CAPSULE BY MOUTH ONCE DAILY      loratadine (CLARITIN) 10 mg tablet Take 10 mg by mouth once daily.      traZODone (DESYREL) 50 MG tablet Take 1 tablet (50 mg total) by mouth every evening. 90 tablet 4    UNABLE TO FIND Take 2 tablets by mouth once daily. Hair Hero vitamin      [DISCONTINUED] diazePAM (VALIUM) 5 MG tablet Take 5 mg by mouth daily as needed for Pain.       No current facility-administered medications on file prior to visit.     Social History     Socioeconomic History    Marital status:    Tobacco Use    Smoking status: Former     Types: Cigarettes    Smokeless tobacco: Never   Substance and Sexual  Activity    Alcohol use: Yes     Comment: 4-5x/wk    Drug use: Never    Sexual activity: Yes     Family History   Problem Relation Age of Onset    Coronary artery disease Mother     Coronary artery disease Father     Stroke Father     Coronary artery disease Sister 60    Coronary artery disease Brother 63    Breast cancer Maternal Grandmother        Review of Systems    Patient Reported Health Risk Assessment  What [age]?: 65-69  Are you male or female?: Female  During the past four weeks, how much have you been bothered by emotional problems such as feeling anxious, depressed, irritable, sad, or downhearted and blue?: Not at all  During the past five weeks, has your physical and/or emotional health limited your social activities with family, friends, neighbors, or groups?: Not at all  During the past four weeks, how much bodily pain have you generally had?: No pain  During the past four weeks, was someone available to help if you needed and wanted help?: Yes, as much as I wanted  During the past four weeks, what was the hardest physical activity you could do for at least two minutes?: Moderate  Can you get to places out of walking distance without help?  (For example, can you travel alone on buses or taxis, or drive your own car?): Yes  Can you go shopping for groceries or clothes without someone's help?: Yes  Can you prepare your own meals?: Yes  Can you do your own housework without help?: Yes  Because of any health problems, do you need the help of another person with your personal care needs such as eating, bathing, dressing, or getting around the house?: No  Can you handle your own money without help?: Yes  During the past four weeks, how would you rate your health in general?: Excellent  How have things been going for you during the past four weeks?: Very well  Are you having difficulties driving your car?: No  Do you always fasten your seat belt when you are in a car?: Yes, usually  How often in the  "past four weeks have you been bothered by falling or dizzy when standing up?: Never  How often in the past four weeks have you been bothered by sexual problems?: Never  How often in the past four weeks have you been bothered by trouble eating well?: Never  How often in the past four weeks have you been bothered by teeth or denture problems?: Never  How often in the past four weeks have you been bothered with problems using the telephone?: Never  How often in the past four weeks have you been bothered by tiredness or fatigue?: Never  Have you fallen two or more times in the past year?: No  Are you afraid of falling?: No  Are you a smoker?: No  During the past four weeks, how many drinks of wine, beer, or other alcoholic beverages did you have?: 2-5 drinks per weeks  Do you exercise for about 20 minutes three or more days a week?: Yes, some of the time  Have you been given any information to help you with hazards in your house that might hurt you?: Yes  Have you been given any information to help you with keeping track of your medications?: Yes  How often do you have trouble taking medicines the way you've been told to take them?: I always take them as prescribed  How confident are you that you can control and manage most of your health problems?: Very confident  What is your race? (Check all that apply.):     Opioid Screening: Patient medication list reviewed, patient is not taking prescription opioids. Patient is not using additional opioids than prescribed. Patient is at low risk of substance abuse based on this opioid use history.     Objective:   /68 (BP Location: Right arm, Patient Position: Sitting, BP Method: Small (Manual))   Pulse 71   Resp 18   Ht 5' 9.02" (1.753 m)   Wt 68.5 kg (151 lb)   SpO2 99%   BMI 22.29 kg/m²     Physical Exam  Constitutional:       General: She is not in acute distress.     Appearance: Normal appearance.   HENT:      Head: Normocephalic and atraumatic.   Eyes: "      General: No scleral icterus.     Conjunctiva/sclera: Conjunctivae normal.   Neck:      Vascular: No carotid bruit.   Cardiovascular:      Rate and Rhythm: Normal rate and regular rhythm.      Pulses: Normal pulses.      Heart sounds: Normal heart sounds. No murmur heard.    No friction rub. No gallop.   Pulmonary:      Effort: Pulmonary effort is normal.      Breath sounds: Normal breath sounds.   Abdominal:      General: Bowel sounds are normal.      Palpations: Abdomen is soft. There is no mass.      Tenderness: There is no abdominal tenderness. There is no guarding or rebound.   Musculoskeletal:         General: No deformity.      Cervical back: No rigidity or tenderness.      Right lower leg: No edema.      Left lower leg: No edema.   Lymphadenopathy:      Cervical: No cervical adenopathy.   Skin:     Coloration: Skin is not jaundiced or pale.      Findings: No erythema.   Neurological:      General: No focal deficit present.      Mental Status: She is alert and oriented to person, place, and time.      Gait: Gait normal.   Psychiatric:         Mood and Affect: Mood normal.         Behavior: Behavior normal.         Thought Content: Thought content normal.         Judgment: Judgment normal.         No flowsheet data found.  Fall Risk Assessment - Outpatient 6/22/2023 1/30/2023 11/8/2022 6/21/2022   Mobility Status Ambulatory Ambulatory Ambulatory Ambulatory   Number of falls 0 0 0 0   Identified as fall risk 0 0 0 0           Depression Screening  Over the past two weeks, has the patient felt down, depressed, or hopeless?: No  Over the past two weeks, has the patient felt little interest or pleasure in doing things?: No  Functional Ability/Safety Screening  Was the patient's timed Up & Go test unsteady or longer than 30 seconds?: No  Does the patient need help with phone, transportation, shopping, preparing meals, housework, laundry, meds, or managing money?: No  Does the patient's home have rugs in the  hallway, lack grab bars in the bathroom, lack handrails on the stairs or have poor lighting?: No  Have you noticed any hearing difficulties?: No  Cognitive Function (Assessed through direct observation with due consideration of information obtained by way of patient reports and/or concerns raised by family, friends, caretakers, or others)    Does the patient repeat questions/statements in the same day?: No  Does the patient have trouble remembering the date, year, and time?: No  Does the patient have difficulty managing finances?: No  Does the patient have a decreased sense of direction?: No    Assessment and Plan:     Encounter for Medicare annual wellness exam  Health Maintenance Due   Topic Date Due    COVID-19 Vaccine (3 - Moderna series) 06/28/2021    DEXA Scan  07/14/2022    Mammogram  08/02/2023     The 10-year ASCVD risk score (Samantha MATOS, et al., 2019) is: 6.4%    Values used to calculate the score:      Age: 67 years      Sex: Female      Is Non- : No      Diabetic: No      Tobacco smoker: No      Systolic Blood Pressure: 114 mmHg      Is BP treated: No      HDL Cholesterol: 53 mg/dL      Total Cholesterol: 212 mg/dL    Recent labs reviewed and discussed.  Her last bmd in 2020 was normal.  So I don't think she needs to repeat it until 2025.  Dr. Snell give her the orders for her mmg.    Vitamin D deficiency  At goal, cont current supplement.    Insomnia associated with menopause  OK to double the trazodone prn.   Follow up in about 1 year (around 6/22/2024).    Medications Ordered This Encounter   Medications    diazePAM (VALIUM) 5 MG tablet     Sig: Take 1 tablet (5 mg total) by mouth daily as needed for Anxiety.     Dispense:  30 tablet     Refill:  0     [unfilled]  No orders of the defined types were placed in this encounter.        Medicare Annual Wellness and Personalized Prevention Plan:   Fall Risk + Home Safety + Hearing Impairment + Depression Screen + Cognitive  Impairment Screen + Health Risk Assessment all reviewed    Advance Care Planning   I attest to discussing Advance Care Planning with patient and/or family member.  Education was provided including the importance of the Health Care Power of , Advance Directives, and/or [place] documentation.  The patient expressed understanding to the importance of this information and discussion.       Follow up in about 1 year (around 6/22/2024). In addition to their scheduled follow up, the patient has also been instructed to follow up on as needed basis.

## 2023-06-22 NOTE — ASSESSMENT & PLAN NOTE
Health Maintenance Due   Topic Date Due    COVID-19 Vaccine (3 - Moderna series) 06/28/2021    DEXA Scan  07/14/2022    Mammogram  08/02/2023     The 10-year ASCVD risk score (Samantha MATOS, et al., 2019) is: 6.4%    Values used to calculate the score:      Age: 67 years      Sex: Female      Is Non- : No      Diabetic: No      Tobacco smoker: No      Systolic Blood Pressure: 114 mmHg      Is BP treated: No      HDL Cholesterol: 53 mg/dL      Total Cholesterol: 212 mg/dL    Recent labs reviewed and discussed.  Her last bmd in 2020 was normal.  So I don't think she needs to repeat it until 2025.  Dr. Snell give her the orders for her mmg.

## 2023-08-15 ENCOUNTER — OFFICE VISIT (OUTPATIENT)
Dept: URGENT CARE | Facility: CLINIC | Age: 68
End: 2023-08-15
Payer: MEDICARE

## 2023-08-15 VITALS
OXYGEN SATURATION: 98 % | RESPIRATION RATE: 19 BRPM | BODY MASS INDEX: 22.36 KG/M2 | WEIGHT: 151 LBS | SYSTOLIC BLOOD PRESSURE: 145 MMHG | HEIGHT: 69 IN | HEART RATE: 72 BPM | DIASTOLIC BLOOD PRESSURE: 85 MMHG | TEMPERATURE: 99 F

## 2023-08-15 DIAGNOSIS — R21 RASH AND NONSPECIFIC SKIN ERUPTION: Primary | ICD-10-CM

## 2023-08-15 PROCEDURE — 96372 PR INJECTION,THERAP/PROPH/DIAG2ST, IM OR SUBCUT: ICD-10-PCS | Mod: ,,, | Performed by: FAMILY MEDICINE

## 2023-08-15 PROCEDURE — 96372 THER/PROPH/DIAG INJ SC/IM: CPT | Mod: ,,, | Performed by: FAMILY MEDICINE

## 2023-08-15 PROCEDURE — 99213 PR OFFICE/OUTPT VISIT, EST, LEVL III, 20-29 MIN: ICD-10-PCS | Mod: 25,,, | Performed by: FAMILY MEDICINE

## 2023-08-15 PROCEDURE — 99213 OFFICE O/P EST LOW 20 MIN: CPT | Mod: 25,,, | Performed by: FAMILY MEDICINE

## 2023-08-15 RX ORDER — PREDNISONE 10 MG/1
30 TABLET ORAL DAILY
Qty: 15 TABLET | Refills: 0 | Status: SHIPPED | OUTPATIENT
Start: 2023-08-15 | End: 2023-08-20

## 2023-08-15 NOTE — PROGRESS NOTES
"Subjective:      Patient ID: Sherron Santos is a 67 y.o. female.    Vitals:  height is 5' 9" (1.753 m) and weight is 68.5 kg (151 lb). Her temperature is 98.5 °F (36.9 °C). Her blood pressure is 145/85 (abnormal) and her pulse is 72. Her respiration is 19 and oxygen saturation is 98%.     Chief Complaint: Rash (Pt thinks they gotten a rash on hands, arms, chest,and face from shrimp started this morning. No OTC meds taken.)    59-year-old female presents to clinic complaining of a red rash on the face and arms that began today.  Patient states she was out in the yd earlier today, was in her pulled doing water aerobics, and then was D frosting some shrimp, patient is concerned it is related to the shrimp because the rash began soon after she handled the shrimp.  Patient states she eats shrimp often and has never had a reaction to it.  Denies any shortness a breath wheezing lip swelling or tongue swelling.  Patient denies any recent travel.  Denies any changes in foods or medications.  Denies any new pets in the house.  Denies any pain related to the rash.  States the rash initially felt warm and prickly but that has resolved.  Denies any itching.  Patient did not eat the shrimp today she only Defrosted them         Constitution: Negative.   HENT: Negative.     Cardiovascular: Negative.    Eyes: Negative.    Respiratory: Negative.     Gastrointestinal: Negative.    Genitourinary: Negative.    Musculoskeletal: Negative.    Skin:  Positive for rash. Negative for erythema.   Allergic/Immunologic: Negative.    Neurological: Negative.    Hematologic/Lymphatic: Negative.       Objective:     Physical Exam   Constitutional: She is oriented to person, place, and time.  Non-toxic appearance. She does not appear ill. No distress.   HENT:   Mouth/Throat: No posterior oropharyngeal erythema (no lip or tongue swelling). Oropharynx is clear.   Eyes: Conjunctivae are normal.   Pulmonary/Chest: Effort normal and breath sounds normal. " "No respiratory distress. She has no wheezes. She has no rhonchi. She has no rales.   Abdominal: Normal appearance.   Neurological: She is alert and oriented to person, place, and time.   Skin: Skin is not diaphoretic and rash (erythemic macular rash of the bilateral upper extremities and face). No erythema   Psychiatric: Her behavior is normal. Mood, judgment and thought content normal.   Nursing note and vitals reviewed.         Previous History      Review of patient's allergies indicates:  No Known Allergies    Past Medical History:   Diagnosis Date    Anxiety     Insomnia     Personal history of colonic polyps      Current Outpatient Medications   Medication Instructions    cholecalciferol (vitamin D3) 5,000 Units, Oral, Daily    COMPOUND HORMONE REPLACEMENT TAKE ONE CAPSULE BY MOUTH ONCE DAILY    diazePAM (VALIUM) 5 mg, Oral, Daily PRN    loratadine (CLARITIN) 10 mg, Oral, Daily    predniSONE (DELTASONE) 30 mg, Oral, Daily    traZODone (DESYREL) 50 mg, Oral, Nightly    UNABLE TO FIND 2 tablets, Oral, Daily, Hair Hero vitamin     Past Surgical History:   Procedure Laterality Date    CATARACT EXTRACTION Bilateral 07/2022    COLONOSCOPY W/ POLYPECTOMY  01/12/2017    HYSTERECTOMY      LUMBAR DISC SURGERY      TONSILLECTOMY       Family History   Problem Relation Age of Onset    Coronary artery disease Mother     Coronary artery disease Father     Stroke Father     Coronary artery disease Sister 60    Coronary artery disease Brother 63    Breast cancer Maternal Grandmother        Social History     Tobacco Use    Smoking status: Former     Current packs/day: 0.00     Types: Cigarettes     Passive exposure: Never    Smokeless tobacco: Never   Substance Use Topics    Alcohol use: Yes     Comment: 4-5x/wk    Drug use: Never        Physical Exam      Vital Signs Reviewed   BP (!) 145/85   Pulse 72   Temp 98.5 °F (36.9 °C)   Resp 19   Ht 5' 9" (1.753 m)   Wt 68.5 kg (151 lb)   SpO2 98%   BMI 22.30 kg/m²        " Procedures    Procedures     Labs     Results for orders placed or performed in visit on 06/15/23   Lipid Panel   Result Value Ref Range    Cholesterol Total 212 (H) <=200 mg/dL    HDL Cholesterol 53 35 - 60 mg/dL    Triglyceride 187 (H) 37 - 140 mg/dL    Cholesterol/HDL Ratio 4 0 - 5    Very Low Density Lipoprotein 37     LDL Cholesterol 122.00 50.00 - 140.00 mg/dL   Comprehensive Metabolic Panel   Result Value Ref Range    Sodium Level 141 136 - 145 mmol/L    Potassium Level 4.8 3.5 - 5.1 mmol/L    Chloride 109 (H) 98 - 107 mmol/L    Carbon Dioxide 26 23 - 31 mmol/L    Glucose Level 91 82 - 115 mg/dL    Blood Urea Nitrogen 13.7 9.8 - 20.1 mg/dL    Creatinine 0.74 0.55 - 1.02 mg/dL    Calcium Level Total 8.6 8.4 - 10.2 mg/dL    Protein Total 6.2 5.8 - 7.6 gm/dL    Albumin Level 3.7 3.4 - 4.8 g/dL    Globulin 2.5 2.4 - 3.5 gm/dL    Albumin/Globulin Ratio 1.5 1.1 - 2.0 ratio    Bilirubin Total 0.4 <=1.5 mg/dL    Alkaline Phosphatase 50 40 - 150 unit/L    Alanine Aminotransferase 18 0 - 55 unit/L    Aspartate Aminotransferase 16 5 - 34 unit/L    eGFR >60 mls/min/1.73/m2   Vitamin D   Result Value Ref Range    Vit D 25 OH 64.5 30.0 - 80.0 ng/mL   Hepatitis C Antibody   Result Value Ref Range    Hep C Ab Interp Nonreactive Nonreactive   CBC with Differential   Result Value Ref Range    WBC 5.71 4.50 - 11.50 x10(3)/mcL    RBC 4.40 4.20 - 5.40 x10(6)/mcL    Hgb 13.2 12.0 - 16.0 g/dL    Hct 41.4 37.0 - 47.0 %    MCV 94.1 (H) 80.0 - 94.0 fL    MCH 30.0 27.0 - 31.0 pg    MCHC 31.9 (L) 33.0 - 36.0 g/dL    RDW 12.6 11.5 - 17.0 %    Platelet 220 130 - 400 x10(3)/mcL    MPV 9.8 7.4 - 10.4 fL    Neut % 53.7 %    Lymph % 34.7 %    Mono % 8.4 %    Eos % 2.3 %    Basophil % 0.7 %    Lymph # 1.98 0.6 - 4.6 x10(3)/mcL    Neut # 3.07 2.1 - 9.2 x10(3)/mcL    Mono # 0.48 0.1 - 1.3 x10(3)/mcL    Eos # 0.13 0 - 0.9 x10(3)/mcL    Baso # 0.04 <=0.2 x10(3)/mcL    IG# 0.01 0 - 0.04 x10(3)/mcL    IG% 0.2 %    NRBC% 0.0 %       Assessment:      1. Rash and nonspecific skin eruption        Plan:   Rash could be related to shrimp but could be related to exposure to chemicals in your pole or being out in the yard earlier today.  I believe ragweed is present currently.  I would consider avoiding shrimp this point.  Continue taking your Claritin daily and Benadryl at bedtime.  Start the oral steroids tomorrow morning.  As discussed if you develop shortness of breath wheezing lip swelling or tongue swelling go to the emergency department immediately.  Keep a diary of what your coming into contact with and putting in your body or on your body to see if you can find a trigger.  Contact this clinic with any concerns  Rash and nonspecific skin eruption    Other orders  -     methylPREDNISolone sod suc(PF) injection 125 mg  -     predniSONE (DELTASONE) 10 MG tablet; Take 3 tablets (30 mg total) by mouth once daily. for 5 days  Dispense: 15 tablet; Refill: 0

## 2023-08-15 NOTE — PATIENT INSTRUCTIONS
Rash could be related to shrimp but could be related to exposure to chemicals in your pole or being out in the yard earlier today.  I believe ragweed is present currently.  I would consider avoiding shrimp this point.  Continue taking your Claritin daily and Benadryl at bedtime.  Start the oral steroids tomorrow morning.  As discussed if you develop shortness of breath wheezing lip swelling or tongue swelling go to the emergency department immediately.  Keep a diary of what your coming into contact with and putting in your body or on your body to see if you can find a trigger.  Contact this clinic with any concerns

## 2023-09-07 ENCOUNTER — PATIENT MESSAGE (OUTPATIENT)
Dept: RESEARCH | Facility: HOSPITAL | Age: 68
End: 2023-09-07
Payer: MEDICARE

## 2023-09-25 PROBLEM — Z00.00 ENCOUNTER FOR MEDICARE ANNUAL WELLNESS EXAM: Status: RESOLVED | Noted: 2022-06-21 | Resolved: 2023-09-25

## 2024-04-08 DIAGNOSIS — G47.00 INSOMNIA, UNSPECIFIED TYPE: ICD-10-CM

## 2024-04-08 RX ORDER — TRAZODONE HYDROCHLORIDE 50 MG/1
50 TABLET ORAL NIGHTLY
Qty: 90 TABLET | Refills: 0 | Status: SHIPPED | OUTPATIENT
Start: 2024-04-08

## 2024-06-20 ENCOUNTER — TELEPHONE (OUTPATIENT)
Dept: INTERNAL MEDICINE | Facility: CLINIC | Age: 69
End: 2024-06-20
Payer: MEDICARE

## 2024-06-20 NOTE — TELEPHONE ENCOUNTER
----- Message from Yareli Nelsno LPN sent at 6/20/2024  7:43 AM CDT -----  Regarding: ANDREIA Montanez 6/27/24 @3:20p  Are there any outstanding tasks in the chart? Pt will need fasting labs    Is there any documentation of tasks? no    Please tell patient to bring living will, power of , or advance directive document to visit if they have it.

## 2024-06-26 ENCOUNTER — LAB VISIT (OUTPATIENT)
Dept: LAB | Facility: HOSPITAL | Age: 69
End: 2024-06-26
Attending: INTERNAL MEDICINE
Payer: MEDICARE

## 2024-06-26 DIAGNOSIS — E78.5 HYPERLIPIDEMIA, UNSPECIFIED HYPERLIPIDEMIA TYPE: ICD-10-CM

## 2024-06-26 DIAGNOSIS — N95.1 INSOMNIA ASSOCIATED WITH MENOPAUSE: ICD-10-CM

## 2024-06-26 DIAGNOSIS — E55.9 VITAMIN D DEFICIENCY: ICD-10-CM

## 2024-06-26 DIAGNOSIS — Z00.00 ENCOUNTER FOR MEDICARE ANNUAL WELLNESS EXAM: ICD-10-CM

## 2024-06-26 LAB
25(OH)D3+25(OH)D2 SERPL-MCNC: 59 NG/ML (ref 30–80)
ALBUMIN SERPL-MCNC: 3.8 G/DL (ref 3.4–4.8)
ALBUMIN/GLOB SERPL: 1.4 RATIO (ref 1.1–2)
ALP SERPL-CCNC: 53 UNIT/L (ref 40–150)
ALT SERPL-CCNC: 14 UNIT/L (ref 0–55)
ANION GAP SERPL CALC-SCNC: 7 MEQ/L
AST SERPL-CCNC: 14 UNIT/L (ref 5–34)
BASOPHILS # BLD AUTO: 0.03 X10(3)/MCL
BASOPHILS NFR BLD AUTO: 0.5 %
BILIRUB SERPL-MCNC: 0.6 MG/DL
BUN SERPL-MCNC: 12.6 MG/DL (ref 9.8–20.1)
CALCIUM SERPL-MCNC: 9.1 MG/DL (ref 8.4–10.2)
CHLORIDE SERPL-SCNC: 109 MMOL/L (ref 98–107)
CHOLEST SERPL-MCNC: 219 MG/DL
CHOLEST/HDLC SERPL: 4 {RATIO} (ref 0–5)
CO2 SERPL-SCNC: 24 MMOL/L (ref 23–31)
CREAT SERPL-MCNC: 0.73 MG/DL (ref 0.55–1.02)
CREAT/UREA NIT SERPL: 17
EOSINOPHIL # BLD AUTO: 0.06 X10(3)/MCL (ref 0–0.9)
EOSINOPHIL NFR BLD AUTO: 1 %
ERYTHROCYTE [DISTWIDTH] IN BLOOD BY AUTOMATED COUNT: 12.6 % (ref 11.5–17)
GFR SERPLBLD CREATININE-BSD FMLA CKD-EPI: >60 ML/MIN/1.73/M2
GLOBULIN SER-MCNC: 2.7 GM/DL (ref 2.4–3.5)
GLUCOSE SERPL-MCNC: 96 MG/DL (ref 82–115)
HCT VFR BLD AUTO: 41.7 % (ref 37–47)
HDLC SERPL-MCNC: 57 MG/DL (ref 35–60)
HGB BLD-MCNC: 13.5 G/DL (ref 12–16)
IMM GRANULOCYTES # BLD AUTO: 0.01 X10(3)/MCL (ref 0–0.04)
IMM GRANULOCYTES NFR BLD AUTO: 0.2 %
LDLC SERPL CALC-MCNC: 126 MG/DL (ref 50–140)
LYMPHOCYTES # BLD AUTO: 2.19 X10(3)/MCL (ref 0.6–4.6)
LYMPHOCYTES NFR BLD AUTO: 35 %
MCH RBC QN AUTO: 30.5 PG (ref 27–31)
MCHC RBC AUTO-ENTMCNC: 32.4 G/DL (ref 33–36)
MCV RBC AUTO: 94.1 FL (ref 80–94)
MONOCYTES # BLD AUTO: 0.45 X10(3)/MCL (ref 0.1–1.3)
MONOCYTES NFR BLD AUTO: 7.2 %
NEUTROPHILS # BLD AUTO: 3.51 X10(3)/MCL (ref 2.1–9.2)
NEUTROPHILS NFR BLD AUTO: 56.1 %
NRBC BLD AUTO-RTO: 0 %
PLATELET # BLD AUTO: 194 X10(3)/MCL (ref 130–400)
PMV BLD AUTO: 9.6 FL (ref 7.4–10.4)
POTASSIUM SERPL-SCNC: 4.9 MMOL/L (ref 3.5–5.1)
PROT SERPL-MCNC: 6.5 GM/DL (ref 5.8–7.6)
RBC # BLD AUTO: 4.43 X10(6)/MCL (ref 4.2–5.4)
SODIUM SERPL-SCNC: 140 MMOL/L (ref 136–145)
TRIGL SERPL-MCNC: 181 MG/DL (ref 37–140)
VLDLC SERPL CALC-MCNC: 36 MG/DL
WBC # BLD AUTO: 6.25 X10(3)/MCL (ref 4.5–11.5)

## 2024-06-26 PROCEDURE — 36415 COLL VENOUS BLD VENIPUNCTURE: CPT

## 2024-06-26 PROCEDURE — 82306 VITAMIN D 25 HYDROXY: CPT

## 2024-06-26 PROCEDURE — 80061 LIPID PANEL: CPT

## 2024-06-26 PROCEDURE — 80053 COMPREHEN METABOLIC PANEL: CPT

## 2024-06-26 PROCEDURE — 85025 COMPLETE CBC W/AUTO DIFF WBC: CPT

## 2024-06-27 ENCOUNTER — OFFICE VISIT (OUTPATIENT)
Dept: INTERNAL MEDICINE | Facility: CLINIC | Age: 69
End: 2024-06-27
Payer: MEDICARE

## 2024-06-27 VITALS
RESPIRATION RATE: 18 BRPM | HEIGHT: 69 IN | SYSTOLIC BLOOD PRESSURE: 122 MMHG | BODY MASS INDEX: 22.22 KG/M2 | WEIGHT: 150 LBS | OXYGEN SATURATION: 98 % | DIASTOLIC BLOOD PRESSURE: 72 MMHG | HEART RATE: 77 BPM

## 2024-06-27 DIAGNOSIS — N95.1 INSOMNIA ASSOCIATED WITH MENOPAUSE: ICD-10-CM

## 2024-06-27 DIAGNOSIS — Z13.6 ENCOUNTER FOR SCREENING FOR CARDIOVASCULAR DISORDERS: ICD-10-CM

## 2024-06-27 DIAGNOSIS — Z00.00 ENCOUNTER FOR MEDICARE ANNUAL WELLNESS EXAM: Primary | ICD-10-CM

## 2024-06-27 DIAGNOSIS — G47.00 INSOMNIA, UNSPECIFIED TYPE: ICD-10-CM

## 2024-06-27 RX ORDER — TRAZODONE HYDROCHLORIDE 50 MG/1
75 TABLET ORAL NIGHTLY
Qty: 135 TABLET | Refills: 3 | Status: SHIPPED | OUTPATIENT
Start: 2024-06-27

## 2024-06-27 RX ORDER — DIAZEPAM 5 MG/1
5 TABLET ORAL DAILY PRN
Qty: 30 TABLET | Refills: 0 | Status: SHIPPED | OUTPATIENT
Start: 2024-06-27

## 2024-06-27 NOTE — ASSESSMENT & PLAN NOTE
Pt here for OB f/u. Denies any complications today. Reports +FM.  Good phone# [phone]  Pharmacy verified with pt.  Pt states she had her appt with Dr. Guevara this morning.   Pt scheduled for 1hr gtt tomorrow also advised to do PNP as well tomorrow. Reprinted lab order.    Trazodone 75 mg works for her.  I sent a rx.

## 2024-06-27 NOTE — ASSESSMENT & PLAN NOTE
Health Maintenance Due   Topic Date Due    RSV Vaccine (Age 60+ and Pregnant patients) (1 - 1-dose 60+ series) Never done    DEXA Scan  07/14/2022    COVID-19 Vaccine (3 - 2023-24 season) 09/01/2023    Mammogram  09/05/2024     The 10-year ASCVD risk score (Samantha MATOS, et al., 2019) is: 8%    Values used to calculate the score:      Age: 68 years      Sex: Female      Is Non- : No      Diabetic: No      Tobacco smoker: No      Systolic Blood Pressure: 122 mmHg      Is BP treated: No      HDL Cholesterol: 57 mg/dL      Total Cholesterol: 219 mg/dL    Recent labs reviewed and discussed.  Rec vaccines discussed.  Will get Ca score given family history of early CAD.

## 2024-06-27 NOTE — PROGRESS NOTES
Subjective:        Patient Care Team:  Angelica Montanez MD as PCP - General (Internal Medicine)     Chief Complaint: Medicare AWV (Discuss labs )      HPI:She is here for a medicare wellness.  She tried the trazodone for sleep, and she finds that 75 mg works the best.  She would like a rx.    Problem Noted   Encounter for Medicare Annual Wellness Exam 6/21/2022   Insomnia Associated With Menopause 6/22/2023   Vitamin D Deficiency 6/21/2022   Family History of Early Cad 6/21/2022        The patient's Health Maintenance was reviewed and the following appears to be due:   Health Maintenance Due   Topic Date Due    RSV Vaccine (Age 60+ and Pregnant patients) (1 - 1-dose 60+ series) Never done    DEXA Scan  07/14/2022    COVID-19 Vaccine (3 - 2023-24 season) 09/01/2023    Mammogram  09/05/2024       Problem List  Active Problem List with Overview Notes    Diagnosis Date Noted    Encounter for Medicare annual wellness exam 06/21/2022    Insomnia associated with menopause 06/22/2023    Vitamin D deficiency 06/21/2022    Family history of early CAD 06/21/2022       Past Medical History:  Past Medical History:   Diagnosis Date    Anxiety     Insomnia     Personal history of colonic polyps      Past Surgical History:   Procedure Laterality Date    CATARACT EXTRACTION Bilateral 07/2022    COLONOSCOPY W/ POLYPECTOMY  01/12/2017    HYSTERECTOMY      LUMBAR DISC SURGERY      TONSILLECTOMY       Review of patient's allergies indicates:  No Known Allergies  Current Outpatient Medications on File Prior to Visit   Medication Sig Dispense Refill    cholecalciferol, vitamin D3, 125 mcg (5,000 unit) Tab Take 5,000 Units by mouth once daily.      COMPOUND HORMONE REPLACEMENT TAKE ONE CAPSULE BY MOUTH ONCE DAILY      loratadine (CLARITIN) 10 mg tablet Take 10 mg by mouth once daily.      [DISCONTINUED] diazePAM (VALIUM) 5 MG tablet Take 1 tablet (5 mg total) by mouth daily as needed for Anxiety. 30 tablet 0    [DISCONTINUED] traZODone  "(DESYREL) 50 MG tablet TAKE ONE TABLET BY MOUTH IN THE EVENING 90 tablet 0    [DISCONTINUED] UNABLE TO FIND Take 2 tablets by mouth once daily. [employer] Hero vitamin       No current facility-administered medications on file prior to visit.     Social History     Socioeconomic History    Marital status:    Tobacco Use    Smoking status: Former     Types: Cigarettes     Passive exposure: Never    Smokeless tobacco: Never   Substance and Sexual Activity    Alcohol use: Yes     Comment: 4-5x/wk    Drug use: Never    Sexual activity: Yes     Social Determinants of Health     Financial Resource Strain: Low Risk  (6/27/2024)    Overall Financial Resource Strain (CARDIA)     Difficulty of Paying Living Expenses: Not hard at all   Food Insecurity: No Food Insecurity (6/27/2024)    Hunger Vital Sign     Worried About Running Out of Food in the Last Year: Never true     Ran Out of Food in the Last Year: Never true   Transportation Needs: No Transportation Needs (6/27/2024)    TRANSPORTATION NEEDS     Transportation : No   Physical Activity: Insufficiently Active (6/27/2024)    Exercise Vital Sign     Days of Exercise per Week: 4 days     Minutes of Exercise per Session: 30 min   Stress: No Stress Concern Present (6/27/2024)    [employer] - Occupational Stress Questionnaire     Feeling of Stress : Not at all   Housing Stability: Low Risk  (6/27/2024)    Housing Stability Vital Sign     Unable to Pay for Housing in the Last Year: No     Homeless in the Last Year: No     Family History   Problem Relation Name Age of Onset    Coronary artery disease Mother      Coronary artery disease Father      Stroke Father      Coronary artery disease Sister  60    Coronary artery disease Brother  63    Breast cancer Maternal Grandmother         Review of Systems        Objective:   /72 (BP Location: Right arm, Patient Position: Sitting, BP Method: Small (Manual))   Pulse 77   Resp 18   Ht 5' 9.02" " (1.753 m)   Wt 68 kg (150 lb)   SpO2 98%   BMI 22.14 kg/m²     Physical Exam  Constitutional:       General: She is not in acute distress.     Appearance: Normal appearance.   HENT:      Head: Normocephalic and atraumatic.   Eyes:      General: No scleral icterus.     Conjunctiva/sclera: Conjunctivae normal.   Neck:      Vascular: No carotid bruit.   Cardiovascular:      Rate and Rhythm: Normal rate and regular rhythm.      Pulses: Normal pulses.      Heart sounds: Normal heart sounds. No murmur heard.     No friction rub. No gallop.   Pulmonary:      Effort: Pulmonary effort is normal.      Breath sounds: Normal breath sounds.   Abdominal:      General: Bowel sounds are normal.      Palpations: Abdomen is soft. There is no mass.      Tenderness: There is no abdominal tenderness. There is no guarding or rebound.   Musculoskeletal:         General: No deformity.      Cervical back: No rigidity or tenderness.      Right lower leg: No edema.      Left lower leg: No edema.   Lymphadenopathy:      Cervical: No cervical adenopathy.   Skin:     Coloration: Skin is not jaundiced or pale.      Findings: No erythema.   Neurological:      General: No focal deficit present.      Mental Status: She is alert and oriented to person, place, and time.      Gait: Gait normal.   Psychiatric:         Mood and Affect: Mood normal.         Behavior: Behavior normal.         Thought Content: Thought content normal.         Judgment: Judgment normal.         Assessment and Plan:     Encounter for Medicare annual wellness exam  Health Maintenance Due   Topic Date Due    RSV Vaccine (Age 60+ and Pregnant patients) (1 - 1-dose 60+ series) Never done    DEXA Scan  07/14/2022    COVID-19 Vaccine (3 - 2023-24 season) 09/01/2023    Mammogram  09/05/2024     The 10-year ASCVD risk score (Samantha MATOS, et al., 2019) is: 8%    Values used to calculate the score:      Age: 68 years      Sex: Female      Is Non- : No       Diabetic: No      Tobacco smoker: No      Systolic Blood Pressure: 122 mmHg      Is BP treated: No      HDL Cholesterol: 57 mg/dL      Total Cholesterol: 219 mg/dL    Recent labs reviewed and discussed.  Rec vaccines discussed.  Will get Ca score given family history of early CAD.      Insomnia associated with menopause  Trazodone 75 mg works for her.  I sent a rx.     Follow up in about 1 year (around 6/27/2025).    Medications Ordered This Encounter   Medications    diazePAM (VALIUM) 5 MG tablet     Sig: Take 1 tablet (5 mg total) by mouth daily as needed for Anxiety.     Dispense:  30 tablet     Refill:  0    traZODone (DESYREL) 50 MG tablet     Sig: Take 1.5 tablets (75 mg total) by mouth every evening.     Dispense:  135 tablet     Refill:  3     [unfilled]  Orders Placed This Encounter   Procedures    CT Calcium Scoring Cardiac     Standing Status:   Future     Standing Expiration Date:   6/27/2025     Order Specific Question:   May the Radiologist modify the order per protocol to meet the clinical needs of the patient?     Answer:   Yes         A comprehensive HEALTH RISK ASSESSMENT was completed today. Results are summarized below:    There are NO EMOTIONAL/SOCIAL CONCERNS identified on today's screening for Social Isolation, Depression and Anxiety.    There are NO COGNITIVE FUNCTION CONCERNS identified on today's screening.  There are NO FUNCTIONAL OR SAFETY CONCERNS were identified on today's screening for Physical Symptoms, Nutritional, Cognitive Function, Home Safety/Living Situation, Fall Risk, Activities of Daily Living, Independent Activities of Daily Living, Physical Activity, Timed Up and Go test and Whisper test.   The patient reports NO OPIOID PRESCRIPTIONS. This was confirmed through medication reconciliation and the [facility] website.    The patient is NOT A TOBACCO USER.  The patient reports NO SIGNIFICANT ALCOHOL USE.     All Questions regarding food, transportation or housing were not answered  today.    Advance Care Planning     Date: 06/27/2024    She has a living will and medical POA.          Follow up in about 1 year (around 6/27/2025). In addition to their scheduled follow up, the patient has also been instructed to follow up on as needed basis.

## 2024-07-16 ENCOUNTER — HOSPITAL ENCOUNTER (OUTPATIENT)
Dept: RADIOLOGY | Facility: HOSPITAL | Age: 69
Discharge: HOME | End: 2024-07-16
Attending: INTERNAL MEDICINE
Payer: MEDICARE

## 2024-07-16 DIAGNOSIS — Z13.6 ENCOUNTER FOR SCREENING FOR CARDIOVASCULAR DISORDERS: ICD-10-CM

## 2024-07-16 PROCEDURE — 75571 CT HRT W/O DYE W/CA TEST: CPT | Mod: TC

## 2024-07-23 DIAGNOSIS — E78.5 HYPERLIPIDEMIA, UNSPECIFIED HYPERLIPIDEMIA TYPE: Primary | ICD-10-CM

## 2024-07-23 DIAGNOSIS — M54.2 NECK PAIN: Primary | ICD-10-CM

## 2024-07-23 DIAGNOSIS — R93.1 ELEVATED CORONARY ARTERY CALCIUM SCORE: ICD-10-CM

## 2024-07-23 RX ORDER — DIAZEPAM 5 MG/1
5 TABLET ORAL DAILY PRN
Qty: 30 TABLET | Refills: 0 | Status: SHIPPED | OUTPATIENT
Start: 2024-07-23

## 2024-07-23 RX ORDER — ROSUVASTATIN CALCIUM 10 MG/1
10 TABLET, COATED ORAL DAILY
Qty: 30 TABLET | Refills: 3 | Status: SHIPPED | OUTPATIENT
Start: 2024-07-23

## 2024-07-23 NOTE — PROGRESS NOTES
Let her know her calcium score is quite high at 426 indicating a large amount of calcification in her coronary arteries.  If she hasn't seen a cardiologist, I would recommend she see one.  They may want to do a stress test.  I also think she should take a statin to minimize her CV risk.  If she is in agreement, I would rec crestor 10 mg daily, disp 30, 3 refills and repeat lipids and lft in 3 mos.

## 2024-08-06 ENCOUNTER — OFFICE VISIT (OUTPATIENT)
Dept: URGENT CARE | Facility: CLINIC | Age: 69
End: 2024-08-06
Payer: MEDICARE

## 2024-08-06 VITALS
DIASTOLIC BLOOD PRESSURE: 66 MMHG | SYSTOLIC BLOOD PRESSURE: 148 MMHG | WEIGHT: 150 LBS | OXYGEN SATURATION: 97 % | RESPIRATION RATE: 18 BRPM | HEART RATE: 71 BPM | BODY MASS INDEX: 22.22 KG/M2 | HEIGHT: 69 IN | TEMPERATURE: 99 F

## 2024-08-06 DIAGNOSIS — J02.9 SORE THROAT: ICD-10-CM

## 2024-08-06 DIAGNOSIS — R52 BODY ACHES: Primary | ICD-10-CM

## 2024-08-06 LAB
CTP QC/QA: YES
SARS-COV-2 AG RESP QL IA.RAPID: NEGATIVE

## 2024-08-06 PROCEDURE — 99213 OFFICE O/P EST LOW 20 MIN: CPT | Mod: ,,, | Performed by: FAMILY MEDICINE

## 2024-08-06 PROCEDURE — 87811 SARS-COV-2 COVID19 W/OPTIC: CPT | Mod: QW,,, | Performed by: FAMILY MEDICINE

## 2024-08-06 RX ORDER — AZITHROMYCIN 250 MG/1
TABLET, FILM COATED ORAL
Qty: 6 TABLET | Refills: 0 | Status: SHIPPED | OUTPATIENT
Start: 2024-08-06

## 2024-08-06 RX ORDER — PREDNISONE 10 MG/1
30 TABLET ORAL DAILY
Qty: 15 TABLET | Refills: 0 | Status: SHIPPED | OUTPATIENT
Start: 2024-08-06 | End: 2024-08-11

## 2024-08-13 ENCOUNTER — OFFICE VISIT (OUTPATIENT)
Dept: INTERNAL MEDICINE | Facility: CLINIC | Age: 69
End: 2024-08-13
Payer: MEDICARE

## 2024-08-13 ENCOUNTER — LAB VISIT (OUTPATIENT)
Dept: LAB | Facility: HOSPITAL | Age: 69
End: 2024-08-13
Attending: INTERNAL MEDICINE
Payer: MEDICARE

## 2024-08-13 VITALS
DIASTOLIC BLOOD PRESSURE: 74 MMHG | BODY MASS INDEX: 22.66 KG/M2 | RESPIRATION RATE: 18 BRPM | HEART RATE: 92 BPM | SYSTOLIC BLOOD PRESSURE: 144 MMHG | WEIGHT: 153 LBS | HEIGHT: 69 IN | OXYGEN SATURATION: 99 %

## 2024-08-13 DIAGNOSIS — R03.0 ELEVATED BP WITHOUT DIAGNOSIS OF HYPERTENSION: ICD-10-CM

## 2024-08-13 DIAGNOSIS — R06.02 SOB (SHORTNESS OF BREATH): ICD-10-CM

## 2024-08-13 DIAGNOSIS — R53.81 MALAISE: Primary | ICD-10-CM

## 2024-08-13 DIAGNOSIS — M79.89 LEG SWELLING: ICD-10-CM

## 2024-08-13 DIAGNOSIS — R53.81 MALAISE: ICD-10-CM

## 2024-08-13 LAB
ALBUMIN SERPL-MCNC: 3.7 G/DL (ref 3.4–4.8)
ALBUMIN/GLOB SERPL: 1.3 RATIO (ref 1.1–2)
ALP SERPL-CCNC: 47 UNIT/L (ref 40–150)
ALT SERPL-CCNC: 24 UNIT/L (ref 0–55)
ANION GAP SERPL CALC-SCNC: 5 MEQ/L
AST SERPL-CCNC: 16 UNIT/L (ref 5–34)
BASOPHILS # BLD AUTO: 0.04 X10(3)/MCL
BASOPHILS NFR BLD AUTO: 0.6 %
BILIRUB SERPL-MCNC: 0.4 MG/DL
BUN SERPL-MCNC: 14.4 MG/DL (ref 9.8–20.1)
CALCIUM SERPL-MCNC: 9.1 MG/DL (ref 8.4–10.2)
CHLORIDE SERPL-SCNC: 107 MMOL/L (ref 98–107)
CO2 SERPL-SCNC: 26 MMOL/L (ref 23–31)
CREAT SERPL-MCNC: 0.69 MG/DL (ref 0.55–1.02)
CREAT/UREA NIT SERPL: 21
D DIMER PPP IA.FEU-MCNC: <0.27 UG/ML FEU (ref 0–0.5)
EOSINOPHIL # BLD AUTO: 0.02 X10(3)/MCL (ref 0–0.9)
EOSINOPHIL NFR BLD AUTO: 0.3 %
ERYTHROCYTE [DISTWIDTH] IN BLOOD BY AUTOMATED COUNT: 12.9 % (ref 11.5–17)
GFR SERPLBLD CREATININE-BSD FMLA CKD-EPI: >60 ML/MIN/1.73/M2
GLOBULIN SER-MCNC: 2.8 GM/DL (ref 2.4–3.5)
GLUCOSE SERPL-MCNC: 86 MG/DL (ref 82–115)
HCT VFR BLD AUTO: 40 % (ref 37–47)
HGB BLD-MCNC: 12.9 G/DL (ref 12–16)
IMM GRANULOCYTES # BLD AUTO: 0.02 X10(3)/MCL (ref 0–0.04)
IMM GRANULOCYTES NFR BLD AUTO: 0.3 %
LYMPHOCYTES # BLD AUTO: 2.44 X10(3)/MCL (ref 0.6–4.6)
LYMPHOCYTES NFR BLD AUTO: 38.2 %
MCH RBC QN AUTO: 30.1 PG (ref 27–31)
MCHC RBC AUTO-ENTMCNC: 32.3 G/DL (ref 33–36)
MCV RBC AUTO: 93.5 FL (ref 80–94)
MONOCYTES # BLD AUTO: 0.5 X10(3)/MCL (ref 0.1–1.3)
MONOCYTES NFR BLD AUTO: 7.8 %
NEUTROPHILS # BLD AUTO: 3.37 X10(3)/MCL (ref 2.1–9.2)
NEUTROPHILS NFR BLD AUTO: 52.8 %
NRBC BLD AUTO-RTO: 0 %
PLATELET # BLD AUTO: 247 X10(3)/MCL (ref 130–400)
PLATELETS.RETICULATED NFR BLD AUTO: 1.3 % (ref 0.9–11.2)
PMV BLD AUTO: 9.3 FL (ref 7.4–10.4)
POTASSIUM SERPL-SCNC: 4 MMOL/L (ref 3.5–5.1)
PROT SERPL-MCNC: 6.5 GM/DL (ref 5.8–7.6)
RBC # BLD AUTO: 4.28 X10(6)/MCL (ref 4.2–5.4)
SODIUM SERPL-SCNC: 138 MMOL/L (ref 136–145)
TSH SERPL-ACNC: 0.96 UIU/ML (ref 0.35–4.94)
WBC # BLD AUTO: 6.39 X10(3)/MCL (ref 4.5–11.5)

## 2024-08-13 PROCEDURE — 85025 COMPLETE CBC W/AUTO DIFF WBC: CPT

## 2024-08-13 PROCEDURE — 85379 FIBRIN DEGRADATION QUANT: CPT

## 2024-08-13 PROCEDURE — 36415 COLL VENOUS BLD VENIPUNCTURE: CPT

## 2024-08-13 PROCEDURE — 84443 ASSAY THYROID STIM HORMONE: CPT

## 2024-08-13 PROCEDURE — 80053 COMPREHEN METABOLIC PANEL: CPT

## 2024-08-13 PROCEDURE — 99214 OFFICE O/P EST MOD 30 MIN: CPT | Mod: ,,, | Performed by: INTERNAL MEDICINE

## 2024-08-13 RX ORDER — HYDROCHLOROTHIAZIDE 12.5 MG/1
12.5 TABLET ORAL DAILY PRN
Qty: 30 TABLET | Refills: 11 | Status: SHIPPED | OUTPATIENT
Start: 2024-08-13 | End: 2025-08-13

## 2024-08-13 NOTE — ASSESSMENT & PLAN NOTE
I have advised she monitor her BP once or twice daily.  I rx'ed hctz 12.5 mg daily prn for a sysolic over 135 or a diastolic over 95.  Will get EKG.  She is scheduled to see the cardiologist tomorrow for an elevated calcium score.  I suspect some of this could be anxiety related.  I will f/u with her in 3 weeks.

## 2024-08-13 NOTE — ASSESSMENT & PLAN NOTE
She has a minute amount of swelling in her left leg.  Given the light headedness and sob, will check a d-dimer and consider an u/s if elevated.

## 2024-08-13 NOTE — PROGRESS NOTES
Subjective:      Chief Complaint: Follow-up (She went to [facility] on 8/6/24 she was concerned bc her family was dx with walking pneumonia/She was light headed and dizzy, sore throat, some aches in her neck /Her symptoms come and go /She was concerned bc her BP was high at [facility] and has been high at home )      HPI:She started feeling bad a few weeks ago -- mild headaches, mild sore throat, light headedness, mild sob.  Her son had walking pneumonia.  About a week into her sx, she went to the walk in clinic.  She was given a zpak and steroids just in case.  She tested Covid negative.  She finished her abx on Saturday am, and she felt worse -- more light headed.  She bought a bp cuff and has been checking it.  It been 120s-140s.  Her anxiety is high right now.  She is taking tylenol prn for low grade fevers or headaches.  She isn't taking any decongestants.  She started crestor about a week ago.  Her systolic was 155 last night.  No cough.    She took a valium this week, and it helped her rest.    Problem Noted   Encounter for Medicare Annual Wellness Exam 6/21/2022   Malaise 8/13/2024   Elevated Bp Without Diagnosis of Hypertension 8/13/2024   Leg Swelling 8/13/2024   Insomnia Associated With Menopause 6/22/2023   Vitamin D Deficiency 6/21/2022   Family History of Early Cad 6/21/2022        The patient's Health Maintenance was reviewed and the following appears to be due:   Health Maintenance Due   Topic Date Due    RSV Vaccine (Age 60+ and Pregnant patients) (1 - 1-dose 60+ series) Never done    DEXA Scan  07/14/2022    COVID-19 Vaccine (3 - 2023-24 season) 09/01/2023    Mammogram  09/05/2024       Past Medical History:  Past Medical History:   Diagnosis Date    Anxiety     Insomnia     Personal history of colonic polyps      Review of patient's allergies indicates:  No Known Allergies  Current Outpatient Medications on File Prior to Visit   Medication Sig Dispense Refill    cholecalciferol, vitamin D3, 125 mcg (5,000 unit)  "Tab Take 5,000 Units by mouth once daily.      COMPOUND HORMONE REPLACEMENT TAKE ONE CAPSULE BY MOUTH ONCE DAILY      diazePAM (VALIUM) 5 MG tablet Take 1 tablet (5 mg total) by mouth daily as needed for Anxiety. 30 tablet 0    rosuvastatin (CRESTOR) 10 MG tablet Take 1 tablet (10 mg total) by mouth once daily. 30 tablet 3    traZODone (DESYREL) 50 MG tablet Take 1.5 tablets (75 mg total) by mouth every evening. 135 tablet 3    [DISCONTINUED] azithromycin (Z-IASC) 250 MG tablet Take 2 tablets by mouth on day 1; Take 1 tablet by mouth on days 2-5 6 tablet 0    [DISCONTINUED] loratadine (CLARITIN) 10 mg tablet Take 10 mg by mouth once daily.       No current facility-administered medications on file prior to visit.       Review of Systems    Objective:   BP (!) 144/74 (BP Location: Right arm, Patient Position: Sitting, BP Method: Small (Manual))   Pulse 92   Resp 18   Ht 5' 9.02" (1.753 m)   Wt 69.4 kg (153 lb)   SpO2 99%   BMI 22.58 kg/m²     Physical Exam  Constitutional:       General: She is not in acute distress.     Appearance: Normal appearance.   HENT:      Head: Normocephalic and atraumatic.   Eyes:      General: No scleral icterus.     Conjunctiva/sclera: Conjunctivae normal.   Neck:      Vascular: No carotid bruit.   Cardiovascular:      Rate and Rhythm: Normal rate and regular rhythm.      Pulses: Normal pulses.      Heart sounds: Normal heart sounds. No murmur heard.     No friction rub. No gallop.   Pulmonary:      Effort: Pulmonary effort is normal.      Breath sounds: Normal breath sounds.   Musculoskeletal:         General: No deformity.      Cervical back: No rigidity or tenderness.      Right lower leg: No edema.      Left lower leg: No edema.   Lymphadenopathy:      Cervical: No cervical adenopathy.   Skin:     Coloration: Skin is not jaundiced or pale.      Findings: No erythema.   Neurological:      General: No focal deficit present.      Mental Status: She is alert and oriented to person, " place, and time.      Gait: Gait normal.   Psychiatric:         Mood and Affect: Mood normal.         Behavior: Behavior normal.         Thought Content: Thought content normal.         Judgment: Judgment normal.       Assessment and Plan:     Malaise  Will check labs to r/o infection, metabolic abnormality, thyroid issue.    Elevated BP without diagnosis of hypertension  I have advised she monitor her BP once or twice daily.  I rx'ed hctz 12.5 mg daily prn for a sysolic over 135 or a diastolic over 95.  Will get EKG.  She is scheduled to see the cardiologist tomorrow for an elevated calcium score.  I suspect some of this could be anxiety related.  I will f/u with her in 3 weeks.    Leg swelling  She has a minute amount of swelling in her left leg.  Given the light headedness and sob, will check a d-dimer and consider an u/s if elevated.      Follow up in about 3 weeks (around 9/3/2024).    Medications Ordered This Encounter   Medications    hydroCHLOROthiazide (HYDRODIURIL) 12.5 MG Tab     Sig: Take 1 tablet (12.5 mg total) by mouth daily as needed (systolic over >135 or a diastolic over 85.).     Dispense:  30 tablet     Refill:  11     .     [unfilled]  Orders Placed This Encounter   Procedures    CBC Auto Differential     Standing Status:   Future     Standing Expiration Date:   10/12/2025    Comprehensive Metabolic Panel     Standing Status:   Future     Standing Expiration Date:   10/12/2025    TSH     Standing Status:   Future     Standing Expiration Date:   10/12/2025    D-Dimer, Quantitative     Standing Status:   Future     Standing Expiration Date:   11/11/2025

## 2024-09-03 ENCOUNTER — LAB VISIT (OUTPATIENT)
Dept: LAB | Facility: HOSPITAL | Age: 69
End: 2024-09-03
Attending: NURSE PRACTITIONER
Payer: MEDICARE

## 2024-09-03 DIAGNOSIS — E78.5 HYPERLIPIDEMIA, UNSPECIFIED HYPERLIPIDEMIA TYPE: ICD-10-CM

## 2024-09-03 DIAGNOSIS — R93.1 ELEVATED CORONARY ARTERY CALCIUM SCORE: ICD-10-CM

## 2024-09-03 DIAGNOSIS — R06.09 DOE (DYSPNEA ON EXERTION): ICD-10-CM

## 2024-09-03 DIAGNOSIS — R07.9 CHEST PAIN, UNSPECIFIED TYPE: Primary | ICD-10-CM

## 2024-09-03 DIAGNOSIS — I10 HYPERTENSION, UNSPECIFIED TYPE: ICD-10-CM

## 2024-09-03 LAB
ALBUMIN SERPL-MCNC: 3.9 G/DL (ref 3.4–4.8)
ALP SERPL-CCNC: 49 UNIT/L (ref 40–150)
ALT SERPL-CCNC: 18 UNIT/L (ref 0–55)
AST SERPL-CCNC: 15 UNIT/L (ref 5–34)
BILIRUB DIRECT SERPL-MCNC: 0.2 MG/DL (ref 0–?)
BILIRUB SERPL-MCNC: 0.4 MG/DL
BILIRUBIN DIRECT+TOT PNL SERPL-MCNC: 0.2 MG/DL (ref 0–0.8)
CHOLEST SERPL-MCNC: 152 MG/DL
CHOLEST/HDLC SERPL: 2 {RATIO} (ref 0–5)
HDLC SERPL-MCNC: 61 MG/DL (ref 35–60)
LDLC SERPL CALC-MCNC: 59 MG/DL (ref 50–140)
PROT SERPL-MCNC: 6.9 GM/DL (ref 5.8–7.6)
TRIGL SERPL-MCNC: 159 MG/DL (ref 37–140)
VLDLC SERPL CALC-MCNC: 32 MG/DL

## 2024-09-03 PROCEDURE — 80061 LIPID PANEL: CPT

## 2024-09-03 PROCEDURE — 80076 HEPATIC FUNCTION PANEL: CPT

## 2024-09-03 PROCEDURE — 36415 COLL VENOUS BLD VENIPUNCTURE: CPT

## 2024-09-04 ENCOUNTER — OFFICE VISIT (OUTPATIENT)
Dept: INTERNAL MEDICINE | Facility: CLINIC | Age: 69
End: 2024-09-04
Payer: MEDICARE

## 2024-09-04 VITALS
RESPIRATION RATE: 18 BRPM | BODY MASS INDEX: 22.22 KG/M2 | DIASTOLIC BLOOD PRESSURE: 62 MMHG | HEIGHT: 69 IN | HEART RATE: 67 BPM | OXYGEN SATURATION: 98 % | SYSTOLIC BLOOD PRESSURE: 122 MMHG | WEIGHT: 150 LBS

## 2024-09-04 DIAGNOSIS — R03.0 ELEVATED BP WITHOUT DIAGNOSIS OF HYPERTENSION: ICD-10-CM

## 2024-09-04 DIAGNOSIS — R53.81 MALAISE: Primary | ICD-10-CM

## 2024-09-04 LAB — PATH REV: NORMAL

## 2024-09-04 PROCEDURE — 99213 OFFICE O/P EST LOW 20 MIN: CPT | Mod: ,,, | Performed by: INTERNAL MEDICINE

## 2024-09-04 RX ORDER — HYDROCHLOROTHIAZIDE 12.5 MG/1
12.5 TABLET ORAL DAILY
Qty: 30 TABLET | Refills: 11 | Status: SHIPPED | OUTPATIENT
Start: 2024-09-04 | End: 2025-09-04

## 2024-09-04 NOTE — ASSESSMENT & PLAN NOTE
She is taking the hctz daily.  I rec she monitor her BP and keep a log.  She is undergoing some testing by the cardiologist over the next few weeks.

## 2024-09-04 NOTE — PROGRESS NOTES
Subjective:      Chief Complaint: Follow-up (3wk)      HPI:She is here for f/u malaise.  She started taking the hctz, and that made hre feel better.  She is getting a stress test this afternoon and an echo next week, and then she will f/u with the cardiologist.  She isn't checking her BP at home after the first week.  She hasn't noticed any leg swelling.  Problem Noted   Malaise 8/13/2024   Elevated Bp Without Diagnosis of Hypertension 8/13/2024   Leg Swelling 8/13/2024   Insomnia Associated With Menopause 6/22/2023   Vitamin D Deficiency 6/21/2022   Family History of Early Cad 6/21/2022        The patient's Health Maintenance was reviewed and the following appears to be due:   Health Maintenance Due   Topic Date Due    RSV Vaccine (Age 60+ and Pregnant patients) (1 - 1-dose 60+ series) Never done    DEXA Scan  07/14/2022    Influenza Vaccine (1) 09/01/2024    COVID-19 Vaccine (3 - 2023-24 season) 09/01/2024    Mammogram  09/05/2024       Past Medical History:  Past Medical History:   Diagnosis Date    Anxiety     Insomnia     Personal history of colonic polyps      Review of patient's allergies indicates:  No Known Allergies  Current Outpatient Medications on File Prior to Visit   Medication Sig Dispense Refill    cholecalciferol, vitamin D3, 125 mcg (5,000 unit) Tab Take 5,000 Units by mouth once daily.      COMPOUND HORMONE REPLACEMENT TAKE ONE CAPSULE BY MOUTH ONCE DAILY      diazePAM (VALIUM) 5 MG tablet Take 1 tablet (5 mg total) by mouth daily as needed for Anxiety. 30 tablet 0    rosuvastatin (CRESTOR) 10 MG tablet Take 1 tablet (10 mg total) by mouth once daily. 30 tablet 3    traZODone (DESYREL) 50 MG tablet Take 1.5 tablets (75 mg total) by mouth every evening. 135 tablet 3    [DISCONTINUED] hydroCHLOROthiazide (HYDRODIURIL) 12.5 MG Tab Take 1 tablet (12.5 mg total) by mouth daily as needed (systolic over >135 or a diastolic over 85.). 30 tablet 11     No current facility-administered medications on file  "prior to visit.       Review of Systems    Objective:   /62 (BP Location: Right arm, Patient Position: Sitting, BP Method: Small (Manual))   Pulse 67   Resp 18   Ht 5' 9.02" (1.753 m)   Wt 68 kg (150 lb)   SpO2 98%   BMI 22.14 kg/m²     Physical Exam  Vitals reviewed.   Constitutional:       General: She is not in acute distress.     Appearance: Normal appearance. She is not ill-appearing or diaphoretic.   HENT:      Head: Normocephalic and atraumatic.   Pulmonary:      Effort: Pulmonary effort is normal.   Skin:     General: Skin is warm and dry.   Neurological:      General: No focal deficit present.      Mental Status: She is alert.   Psychiatric:         Mood and Affect: Mood normal.         Behavior: Behavior normal.         Thought Content: Thought content normal.         Judgment: Judgment normal.       Assessment and Plan:     Malaise  This has resolved.    Elevated BP without diagnosis of hypertension  She is taking the hctz daily.  I rec she monitor her BP and keep a log.  She is undergoing some testing by the cardiologist over the next few weeks.      Follow up in about 10 years (around 9/4/2034).    Medications Ordered This Encounter   Medications    hydroCHLOROthiazide (HYDRODIURIL) 12.5 MG Tab     Sig: Take 1 tablet (12.5 mg total) by mouth once daily.     Dispense:  30 tablet     Refill:  11     .     [unfilled]  No orders of the defined types were placed in this encounter.      "

## 2024-11-12 DIAGNOSIS — E78.5 HYPERLIPIDEMIA, UNSPECIFIED HYPERLIPIDEMIA TYPE: ICD-10-CM

## 2024-11-12 RX ORDER — ROSUVASTATIN CALCIUM 10 MG/1
10 TABLET, COATED ORAL DAILY
Qty: 30 TABLET | Refills: 0 | Status: SHIPPED | OUTPATIENT
Start: 2024-11-12

## 2025-06-21 DIAGNOSIS — G47.00 INSOMNIA, UNSPECIFIED TYPE: ICD-10-CM

## 2025-06-23 RX ORDER — TRAZODONE HYDROCHLORIDE 50 MG/1
75 TABLET ORAL NIGHTLY
Qty: 135 TABLET | Refills: 0 | Status: SHIPPED | OUTPATIENT
Start: 2025-06-23

## 2025-07-03 ENCOUNTER — TELEPHONE (OUTPATIENT)
Dept: INTERNAL MEDICINE | Facility: CLINIC | Age: 70
End: 2025-07-03
Payer: MEDICARE

## 2025-07-03 DIAGNOSIS — Z00.00 ENCOUNTER FOR MEDICARE ANNUAL WELLNESS EXAM: ICD-10-CM

## 2025-07-03 DIAGNOSIS — E78.5 HYPERLIPIDEMIA, UNSPECIFIED HYPERLIPIDEMIA TYPE: Primary | ICD-10-CM

## 2025-07-03 DIAGNOSIS — R03.0 ELEVATED BP WITHOUT DIAGNOSIS OF HYPERTENSION: ICD-10-CM

## 2025-07-03 DIAGNOSIS — E55.9 VITAMIN D DEFICIENCY: ICD-10-CM

## 2025-07-03 NOTE — TELEPHONE ENCOUNTER
----- Message from Nurse Downs sent at 7/3/2025  9:14 AM CDT -----  Regarding: ANDREIA Montanez 7/10/25 @0920  Are there any outstanding tasks in the chart? Patient will need fasting labs    Is there any documentation of tasks? no    Please tell patient to bring living will, power of , or advance directive document to visit if they have it.

## 2025-07-07 ENCOUNTER — LAB VISIT (OUTPATIENT)
Dept: LAB | Facility: HOSPITAL | Age: 70
End: 2025-07-07
Attending: INTERNAL MEDICINE
Payer: MEDICARE

## 2025-07-07 DIAGNOSIS — E55.9 VITAMIN D DEFICIENCY: ICD-10-CM

## 2025-07-07 DIAGNOSIS — Z00.00 ENCOUNTER FOR MEDICARE ANNUAL WELLNESS EXAM: ICD-10-CM

## 2025-07-07 DIAGNOSIS — R03.0 ELEVATED BP WITHOUT DIAGNOSIS OF HYPERTENSION: ICD-10-CM

## 2025-07-07 DIAGNOSIS — E78.5 HYPERLIPIDEMIA, UNSPECIFIED HYPERLIPIDEMIA TYPE: ICD-10-CM

## 2025-07-07 LAB
25(OH)D3+25(OH)D2 SERPL-MCNC: 70 NG/ML (ref 30–80)
ALBUMIN SERPL-MCNC: 3.8 G/DL (ref 3.4–4.8)
ALBUMIN/GLOB SERPL: 1.2 RATIO (ref 1.1–2)
ALP SERPL-CCNC: 47 UNIT/L (ref 40–150)
ALT SERPL-CCNC: 21 UNIT/L (ref 0–55)
ANION GAP SERPL CALC-SCNC: 6 MEQ/L
AST SERPL-CCNC: 17 UNIT/L (ref 11–45)
BASOPHILS # BLD AUTO: 0.02 X10(3)/MCL
BASOPHILS NFR BLD AUTO: 0.3 %
BILIRUB SERPL-MCNC: 0.5 MG/DL
BUN SERPL-MCNC: 17.1 MG/DL (ref 9.8–20.1)
CALCIUM SERPL-MCNC: 8.8 MG/DL (ref 8.4–10.2)
CHLORIDE SERPL-SCNC: 108 MMOL/L (ref 98–107)
CHOLEST SERPL-MCNC: 152 MG/DL
CHOLEST/HDLC SERPL: 3 {RATIO} (ref 0–5)
CO2 SERPL-SCNC: 24 MMOL/L (ref 23–31)
CREAT SERPL-MCNC: 0.67 MG/DL (ref 0.55–1.02)
CREAT/UREA NIT SERPL: 26
EOSINOPHIL # BLD AUTO: 0.06 X10(3)/MCL (ref 0–0.9)
EOSINOPHIL NFR BLD AUTO: 1 %
ERYTHROCYTE [DISTWIDTH] IN BLOOD BY AUTOMATED COUNT: 12.5 % (ref 11.5–17)
GFR SERPLBLD CREATININE-BSD FMLA CKD-EPI: >60 ML/MIN/1.73/M2
GLOBULIN SER-MCNC: 3.3 GM/DL (ref 2.4–3.5)
GLUCOSE SERPL-MCNC: 102 MG/DL (ref 82–115)
HCT VFR BLD AUTO: 40.9 % (ref 37–47)
HDLC SERPL-MCNC: 59 MG/DL (ref 35–60)
HGB BLD-MCNC: 13.3 G/DL (ref 12–16)
IMM GRANULOCYTES # BLD AUTO: 0.01 X10(3)/MCL (ref 0–0.04)
IMM GRANULOCYTES NFR BLD AUTO: 0.2 %
LDLC SERPL CALC-MCNC: 62 MG/DL (ref 50–140)
LYMPHOCYTES # BLD AUTO: 2.22 X10(3)/MCL (ref 0.6–4.6)
LYMPHOCYTES NFR BLD AUTO: 35.5 %
MCH RBC QN AUTO: 30 PG (ref 27–31)
MCHC RBC AUTO-ENTMCNC: 32.5 G/DL (ref 33–36)
MCV RBC AUTO: 92.1 FL (ref 80–94)
MONOCYTES # BLD AUTO: 0.42 X10(3)/MCL (ref 0.1–1.3)
MONOCYTES NFR BLD AUTO: 6.7 %
NEUTROPHILS # BLD AUTO: 3.53 X10(3)/MCL (ref 2.1–9.2)
NEUTROPHILS NFR BLD AUTO: 56.3 %
NRBC BLD AUTO-RTO: 0 %
PLATELET # BLD AUTO: 209 X10(3)/MCL (ref 130–400)
PMV BLD AUTO: 9.8 FL (ref 7.4–10.4)
POTASSIUM SERPL-SCNC: 4.2 MMOL/L (ref 3.5–5.1)
PROT SERPL-MCNC: 7.1 GM/DL (ref 5.8–7.6)
RBC # BLD AUTO: 4.44 X10(6)/MCL (ref 4.2–5.4)
SODIUM SERPL-SCNC: 138 MMOL/L (ref 136–145)
TRIGL SERPL-MCNC: 156 MG/DL (ref 37–140)
VLDLC SERPL CALC-MCNC: 31 MG/DL
WBC # BLD AUTO: 6.26 X10(3)/MCL (ref 4.5–11.5)

## 2025-07-07 PROCEDURE — 85025 COMPLETE CBC W/AUTO DIFF WBC: CPT

## 2025-07-07 PROCEDURE — 80053 COMPREHEN METABOLIC PANEL: CPT

## 2025-07-07 PROCEDURE — 82306 VITAMIN D 25 HYDROXY: CPT

## 2025-07-07 PROCEDURE — 80061 LIPID PANEL: CPT

## 2025-07-07 PROCEDURE — 36415 COLL VENOUS BLD VENIPUNCTURE: CPT

## 2025-07-10 ENCOUNTER — OFFICE VISIT (OUTPATIENT)
Dept: INTERNAL MEDICINE | Facility: CLINIC | Age: 70
End: 2025-07-10
Payer: MEDICARE

## 2025-07-10 VITALS
SYSTOLIC BLOOD PRESSURE: 130 MMHG | BODY MASS INDEX: 21.92 KG/M2 | DIASTOLIC BLOOD PRESSURE: 68 MMHG | HEIGHT: 69 IN | WEIGHT: 148 LBS | RESPIRATION RATE: 18 BRPM | HEART RATE: 69 BPM | OXYGEN SATURATION: 98 %

## 2025-07-10 DIAGNOSIS — R03.0 ELEVATED BP WITHOUT DIAGNOSIS OF HYPERTENSION: ICD-10-CM

## 2025-07-10 DIAGNOSIS — E78.5 HYPERLIPIDEMIA, UNSPECIFIED HYPERLIPIDEMIA TYPE: ICD-10-CM

## 2025-07-10 DIAGNOSIS — Z82.49 FAMILY HISTORY OF EARLY CAD: ICD-10-CM

## 2025-07-10 DIAGNOSIS — E55.9 VITAMIN D DEFICIENCY: ICD-10-CM

## 2025-07-10 DIAGNOSIS — N95.1 INSOMNIA ASSOCIATED WITH MENOPAUSE: ICD-10-CM

## 2025-07-10 DIAGNOSIS — Z00.00 ENCOUNTER FOR MEDICARE ANNUAL WELLNESS EXAM: Primary | ICD-10-CM

## 2025-07-10 PROBLEM — M79.89 LEG SWELLING: Status: RESOLVED | Noted: 2024-08-13 | Resolved: 2025-07-10

## 2025-07-10 RX ORDER — ROSUVASTATIN CALCIUM 20 MG/1
20 TABLET, COATED ORAL
COMMUNITY
Start: 2025-04-29

## 2025-07-10 NOTE — PROGRESS NOTES
Subjective:        Patient Care Team:  Angelica Montanez MD as PCP - General (Internal Medicine)     Chief Complaint: Medicare AWV (Discuss labs )      HPI:She is here for a wellness.  No complaints.  She doesn't exercise like she should.  She hasn't been checking her BP recently.  Its usually 116-130/65-70.  Problem Noted   Encounter for Medicare Annual Wellness Exam 7/10/2025   Elevated Bp Without Diagnosis of Hypertension 8/13/2024   Insomnia Associated With Menopause 6/22/2023   Vitamin D Deficiency 6/21/2022   Family History of Early Cad 6/21/2022   Leg Swelling (Resolved) 8/13/2024        The patient's Health Maintenance was reviewed and the following appears to be due:   Health Maintenance Due   Topic Date Due    RSV Vaccine (Age 60+ and Pregnant patients) (1 - Risk 60-74 years 1-dose series) Never done    DEXA Scan  07/14/2022    COVID-19 Vaccine (3 - 2024-25 season) 09/01/2024    Mammogram  09/30/2025       Problem List  Active Problem List with Overview Notes    Diagnosis Date Noted    Encounter for Medicare annual wellness exam 07/10/2025    Elevated BP without diagnosis of hypertension 08/13/2024    Insomnia associated with menopause 06/22/2023    Vitamin D deficiency 06/21/2022    Family history of early CAD 06/21/2022       Past Medical History:  Past Medical History:   Diagnosis Date    Anxiety     Insomnia     Leg swelling 08/13/2024    Personal history of colonic polyps      Past Surgical History:   Procedure Laterality Date    CATARACT EXTRACTION Bilateral 07/2022    COLONOSCOPY W/ POLYPECTOMY  01/12/2017    EYE SURGERY      Cataracts    HYSTERECTOMY      JOINT REPLACEMENT      Knee    LUMBAR DISC SURGERY      SPINE SURGERY      TONSILLECTOMY       Review of patient's allergies indicates:  No Known Allergies  Medications Ordered Prior to Encounter[1]  Social History[2]  Family History   Problem Relation Name Age of Onset    Coronary artery disease Mother Kodnish     Heart disease Mother Christelle      "Coronary artery disease Father Jay     Stroke Father Jay     Heart disease Father Jay     Coronary artery disease Sister Ronald 60    Heart disease Sister Ronald     Coronary artery disease Brother Enio 63    Heart disease Brother Enio     Stroke Brother Enio     Breast cancer Maternal Grandmother Peri     Cancer Maternal Grandmother Peri     Cancer Maternal Uncle Ayo        Review of Systems   Constitutional:  Negative for fatigue (sleep isn't great, takes trazodone nightly.).   HENT: Negative.     Eyes: Negative.    Respiratory: Negative.     Cardiovascular: Negative.    Gastrointestinal: Negative.    Genitourinary: Negative.    Musculoskeletal: Negative.    Skin:         Some dry skin     Neurological: Negative.    Psychiatric/Behavioral: Negative.             Objective:   /68 (BP Location: Left arm, Patient Position: Sitting)   Pulse 69   Resp 18   Ht 5' 9.02" (1.753 m)   Wt 67.1 kg (148 lb)   SpO2 98%   BMI 21.85 kg/m²     Physical Exam  Constitutional:       General: She is not in acute distress.     Appearance: Normal appearance.   HENT:      Head: Normocephalic and atraumatic.   Eyes:      General: No scleral icterus.     Conjunctiva/sclera: Conjunctivae normal.   Neck:      Vascular: No carotid bruit.   Cardiovascular:      Rate and Rhythm: Normal rate and regular rhythm.      Pulses: Normal pulses.      Heart sounds: Normal heart sounds. No murmur heard.     No friction rub. No gallop.   Pulmonary:      Effort: Pulmonary effort is normal.      Breath sounds: Normal breath sounds.   Abdominal:      General: Bowel sounds are normal.      Palpations: Abdomen is soft. There is no mass.      Tenderness: There is no abdominal tenderness. There is no guarding or rebound.   Musculoskeletal:         General: No deformity.      Cervical back: No rigidity or tenderness.      Right lower leg: No edema.      Left lower leg: No edema.   Lymphadenopathy:      Cervical: No cervical adenopathy.   Skin:     " Coloration: Skin is not jaundiced or pale.      Findings: No erythema.   Neurological:      General: No focal deficit present.      Mental Status: She is alert and oriented to person, place, and time.      Gait: Gait normal.   Psychiatric:         Mood and Affect: Mood normal.         Behavior: Behavior normal.         Thought Content: Thought content normal.         Judgment: Judgment normal.         Assessment and Plan:     Encounter for Medicare annual wellness exam  Health Maintenance Due   Topic Date Due    RSV Vaccine (Age 60+ and Pregnant patients) (1 - Risk 60-74 years 1-dose series) Never done    DEXA Scan  07/14/2022    COVID-19 Vaccine (3 - 2024-25 season) 09/01/2024    Mammogram  09/30/2025     Recent labs reviewed and discussed.    Advance Care Planning    Date: 07/10/2025  Patient did not wish or was not able to name a surrogate decision maker or provide an Advance Care Plan.  She already has advanced directives.         Elevated BP without diagnosis of hypertension  Stable, continue hctz.    Family history of early CAD  She is on a statin that was increased by the cardiologist.      Insomnia associated with menopause  Stable on trazodone.   Follow up in about 1 year (around 7/10/2026).       [unfilled]  No orders of the defined types were placed in this encounter.        A comprehensive HEALTH RISK ASSESSMENT was completed today. Results are summarized below:    There are NO EMOTIONAL/SOCIAL CONCERNS identified on today's screening for Social Isolation, Depression and Anxiety.    There are NO COGNITIVE FUNCTION CONCERNS identified on today's screening.  There are NO FUNCTIONAL OR SAFETY CONCERNS were identified on today's screening for Physical Symptoms, Nutritional, Cognitive Function, Home Safety/Living Situation, Fall Risk, Activities of Daily Living, Independent Activities of Daily Living, Physical Activity, Timed Up and Go test and Whisper test.   The patient reports NO OPIOID PRESCRIPTIONS. This  was confirmed through medication reconciliation.    The patient is NOT A TOBACCO USER.  The patient reports NO SIGNIFICANT ALCOHOL USE.     All Questions regarding food, transportation or housing were not answered today.    Follow up in about 1 year (around 7/10/2026). In addition to their scheduled follow up, the patient has also been instructed to follow up on as needed basis.          [1]   Current Outpatient Medications on File Prior to Visit   Medication Sig Dispense Refill    cholecalciferol, vitamin D3, 125 mcg (5,000 unit) Tab Take 5,000 Units by mouth once daily.      COMPOUND HORMONE REPLACEMENT TAKE ONE CAPSULE BY MOUTH ONCE DAILY      diazePAM (VALIUM) 5 MG tablet Take 1 tablet (5 mg total) by mouth daily as needed for Anxiety. 30 tablet 0    hydroCHLOROthiazide (HYDRODIURIL) 12.5 MG Tab Take 1 tablet (12.5 mg total) by mouth once daily. 30 tablet 11    rosuvastatin (CRESTOR) 20 MG tablet Take 20 mg by mouth.      traZODone (DESYREL) 50 MG tablet Take 1.5 tablets (75 mg total) by mouth every evening. 135 tablet 0    [DISCONTINUED] rosuvastatin (CRESTOR) 10 MG tablet Take 1 tablet (10 mg total) by mouth once daily. 30 tablet 0     No current facility-administered medications on file prior to visit.   [2]   Social History  Socioeconomic History    Marital status:    Tobacco Use    Smoking status: Former     Current packs/day: 0.00     Types: Cigarettes     Passive exposure: Never    Smokeless tobacco: Never   Substance and Sexual Activity    Alcohol use: Yes     Alcohol/week: 70.0 standard drinks of alcohol     Types: 30 Glasses of wine, 40 Shots of liquor per week     Comment: 4-5x/wk    Drug use: Never    Sexual activity: Yes     Partners: Male     Birth control/protection: Post-menopausal     Social Drivers of Health     Financial Resource Strain: Low Risk  (7/7/2025)    Overall Financial Resource Strain (CARDIA)     Difficulty of Paying Living Expenses: Not hard at all   Food Insecurity: No Food  Insecurity (7/7/2025)    Hunger Vital Sign     Worried About Running Out of Food in the Last Year: Never true     Ran Out of Food in the Last Year: Never true   Transportation Needs: No Transportation Needs (7/7/2025)    PRAPARE - Transportation     Lack of Transportation (Medical): No     Lack of Transportation (Non-Medical): No   Physical Activity: Insufficiently Active (7/7/2025)    Exercise Vital Sign     Days of Exercise per Week: 1 day     Minutes of Exercise per Session: 60 min   Stress: Stress Concern Present (7/7/2025)    [facility] [employer] [facility] - Occupational Stress Questionnaire     Feeling of Stress : To some extent   Housing Stability: Low Risk  (7/7/2025)    Housing Stability Vital Sign     Unable to Pay for Housing in the Last Year: No     Number of Times Moved in the Last Year: 0     Homeless in the Last Year: No

## 2025-07-10 NOTE — ASSESSMENT & PLAN NOTE
Health Maintenance Due   Topic Date Due    RSV Vaccine (Age 60+ and Pregnant patients) (1 - Risk 60-74 years 1-dose series) Never done    DEXA Scan  07/14/2022    COVID-19 Vaccine (3 - 2024-25 season) 09/01/2024    Mammogram  09/30/2025     Recent labs reviewed and discussed.    Advance Care Planning     Date: 07/10/2025  Patient did not wish or was not able to name a surrogate decision maker or provide an Advance Care Plan.  She already has advanced directives.